# Patient Record
Sex: FEMALE | Race: WHITE | Employment: FULL TIME | RURAL
[De-identification: names, ages, dates, MRNs, and addresses within clinical notes are randomized per-mention and may not be internally consistent; named-entity substitution may affect disease eponyms.]

---

## 2017-01-20 ENCOUNTER — TELEPHONE (OUTPATIENT)
Dept: FAMILY MEDICINE CLINIC | Age: 42
End: 2017-01-20

## 2017-01-20 DIAGNOSIS — N30.00 ACUTE CYSTITIS WITHOUT HEMATURIA: Primary | ICD-10-CM

## 2017-01-20 RX ORDER — FLUCONAZOLE 150 MG/1
150 TABLET ORAL DAILY
Qty: 1 TAB | Refills: 0 | Status: SHIPPED | OUTPATIENT
Start: 2017-01-20 | End: 2017-01-21

## 2017-01-20 RX ORDER — SULFAMETHOXAZOLE AND TRIMETHOPRIM 800; 160 MG/1; MG/1
1 TABLET ORAL 2 TIMES DAILY
Qty: 14 TAB | Refills: 0 | Status: SHIPPED | OUTPATIENT
Start: 2017-01-20 | End: 2017-01-23

## 2017-01-23 RX ORDER — CIPROFLOXACIN 500 MG/1
500 TABLET ORAL 2 TIMES DAILY
Qty: 20 TAB | Refills: 0 | Status: SHIPPED | OUTPATIENT
Start: 2017-01-23 | End: 2017-02-02

## 2017-01-23 NOTE — TELEPHONE ENCOUNTER
Patient had allergic reaction to Bactrim. Per Eleni, will send in Cipro. Requested Prescriptions     Signed Prescriptions Disp Refills    ciprofloxacin HCl (CIPRO) 500 mg tablet 20 Tab 0     Sig: Take 1 Tab by mouth two (2) times a day for 10 days.      Authorizing Provider: Griffin Alexander, [address] [place] [address]     Ordering User: Kavon Mondragon

## 2017-05-23 ENCOUNTER — HOSPITAL ENCOUNTER (OUTPATIENT)
Dept: MAMMOGRAPHY | Age: 42
Discharge: HOME | End: 2017-05-23
Payer: COMMERCIAL

## 2017-05-23 DIAGNOSIS — N63.20 LEFT BREAST LUMP: ICD-10-CM

## 2017-05-23 DIAGNOSIS — N63.0 LUMP OR MASS IN BREAST: ICD-10-CM

## 2017-05-23 PROCEDURE — 76642 ULTRASOUND BREAST LIMITED: CPT

## 2017-05-23 PROCEDURE — 77066 DX MAMMO INCL CAD BI: CPT

## 2017-06-06 ENCOUNTER — HOSPITAL ENCOUNTER (OUTPATIENT)
Dept: ULTRASOUND IMAGING | Age: 42
Discharge: HOME | End: 2017-06-06
Attending: NURSE PRACTITIONER
Payer: COMMERCIAL

## 2017-06-06 DIAGNOSIS — R10.84 ABDOMINAL PAIN, GENERALIZED: ICD-10-CM

## 2017-06-06 PROCEDURE — 76700 US EXAM ABDOM COMPLETE: CPT

## 2017-06-13 ENCOUNTER — OFFICE VISIT (OUTPATIENT)
Dept: SURGERY | Age: 42
End: 2017-06-13

## 2017-06-13 VITALS
BODY MASS INDEX: 43.87 KG/M2 | DIASTOLIC BLOOD PRESSURE: 77 MMHG | HEART RATE: 98 BPM | WEIGHT: 257 LBS | HEIGHT: 64 IN | TEMPERATURE: 98.5 F | SYSTOLIC BLOOD PRESSURE: 123 MMHG | RESPIRATION RATE: 14 BRPM | OXYGEN SATURATION: 98 %

## 2017-06-13 DIAGNOSIS — K80.20 GALLSTONES: Primary | ICD-10-CM

## 2017-06-13 RX ORDER — PANTOPRAZOLE SODIUM 40 MG/1
40 GRANULE, DELAYED RELEASE ORAL DAILY
COMMUNITY
End: 2017-11-20

## 2017-06-13 NOTE — PROGRESS NOTES
Cholelithasis Consultation      Subjective:      Vira Alarcon is an 38 y.o.  female who was referred by:  Nikki Laguerre MD for evaluation of cholelithasis. Patient complains of epigastric pain. Pain is moderate in intensity. It has been present for several weeks. It radiates to her right shoulder and back. Eating makes it worse. Nothing makes it better. It is associated with nausea, occasional vomiting, reflux . She denies fevers, chills and malaise. Past Medical History:   Diagnosis Date    Headache     Hearing loss      Family History   Problem Relation Age of Onset    Headache Father     Neuropathy Father     Neuropathy Sister     Cancer Maternal Grandmother     Cancer Paternal Grandmother     Breast Cancer Paternal Grandmother     Cancer Paternal Grandfather     Headache Other     Breast Cancer Paternal Aunt     Breast Cancer Cousin      Cannot display prior to admission medications because the patient has not been admitted in this contact. Allergies   Allergen Reactions    Bactrim [Sulfamethoprim Ds] Rash     Social History     Social History    Marital status:      Spouse name: N/A    Number of children: N/A    Years of education: N/A     Occupational History    Not on file. Social History Main Topics    Smoking status: Never Smoker    Smokeless tobacco: Not on file    Alcohol use No    Drug use: Not on file    Sexual activity: Not on file     Other Topics Concern    Not on file     Social History Narrative       Review of Systems: A comprehensive review of systems was negative except for that written in the HPI.      Objective:        Visit Vitals    /77 (BP 1 Location: Left arm, BP Patient Position: Sitting)    Pulse 98    Temp 98.5 °F (36.9 °C) (Oral)    Resp 14    Ht 5' 3.5\" (1.613 m)    Wt 257 lb (116.6 kg)    LMP 05/25/2017 (Exact Date)    SpO2 98%    BMI 44.81 kg/m2        Visit Vitals    /77 (BP 1 Location: Left arm, BP Patient Position: Sitting)    Pulse 98    Temp 98.5 °F (36.9 °C) (Oral)    Resp 14    Ht 5' 3.5\" (1.613 m)    Wt 257 lb (116.6 kg)    LMP 2017 (Exact Date)    SpO2 98%    BMI 44.81 kg/m2     General appearance: alert, cooperative, no distress, appears stated age, morbidly obese  Head: Normocephalic, without obvious abnormality, atraumatic  Eyes: conjunctivae/corneas clear., EOM's intact. Lungs: clear to auscultation bilaterally  Heart: regular rate and rhythm, S1, S2 normal, no murmur, click, rub or gallop  Abdomen: soft, non-tender. Bowel sounds normal. No masses,  no organomegaly  Neurologic: Alert and oriented X 3, normal strength and tone. . Normal coordination and gait    Imaging:  images and reports reviewed      Assessment:     Symptomatic Cholelithasis    Plan/Recommendations/Medical Decision Makin. I recommend laparoscopic cholecystectomy with possible cholangiogram  Treatment alternatives were discussed. 2. Discussed aspects of surgical intervention, methods, risks (including by not limited to infection, bleeding, retained gallstones in the abdominal cavity and/or the main bile ducts, and injury to adjacent structures including the biliary system, common bile duct, and intestines.)  The patient understands the risks, any and all questions were answered to the patient's satisfaction. 3. Patient does wish to proceed with surgery.        Surgery : laparoscopic cholecystectomy, cholangiogram, possible open    Length:  1 hours    Diagnosis :     ICD-10-CM ICD-9-CM   1. Gallstones K80.20 574.20       Anesthesia : general anesthesia    Surgery Type: Outpatient    Position:  supine    Hospital : [facility]    Blood thinner NO

## 2017-06-13 NOTE — PROGRESS NOTES
1. Have you been to the ER, urgent care clinic since your last visit? Hospitalized since your last visit?no  2. Have you seen or consulted any other health care providers outside of the [address] [place] [address] since your last visit? Include any pap smears or colon screening.  no

## 2017-06-19 ENCOUNTER — HOSPITAL ENCOUNTER (OUTPATIENT)
Dept: PREADMISSION TESTING | Age: 42
Discharge: HOME | End: 2017-06-19
Payer: COMMERCIAL

## 2017-06-19 ENCOUNTER — TELEPHONE (OUTPATIENT)
Dept: SURGERY | Age: 42
End: 2017-06-19

## 2017-06-19 ENCOUNTER — HOSPITAL ENCOUNTER (OUTPATIENT)
Dept: GENERAL RADIOLOGY | Age: 42
Discharge: HOME | End: 2017-06-19
Attending: SURGERY
Payer: COMMERCIAL

## 2017-06-19 VITALS
OXYGEN SATURATION: 99 % | DIASTOLIC BLOOD PRESSURE: 80 MMHG | SYSTOLIC BLOOD PRESSURE: 117 MMHG | WEIGHT: 257 LBS | HEART RATE: 96 BPM | RESPIRATION RATE: 16 BRPM | TEMPERATURE: 98.1 F | BODY MASS INDEX: 43.87 KG/M2 | HEIGHT: 64 IN

## 2017-06-19 LAB
ALBUMIN SERPL BCP-MCNC: 3.6 G/DL (ref 3.5–5)
ALBUMIN/GLOB SERPL: 1.1 {RATIO} (ref 1.1–2.2)
ALP SERPL-CCNC: 61 U/L (ref 45–117)
ALT SERPL-CCNC: 49 U/L (ref 12–78)
ANION GAP BLD CALC-SCNC: 10 MMOL/L (ref 5–15)
AST SERPL W P-5'-P-CCNC: 57 U/L (ref 15–37)
ATRIAL RATE: 81 BPM
BASOPHILS # BLD AUTO: 0 K/UL (ref 0–0.1)
BASOPHILS # BLD: 1 % (ref 0–1)
BILIRUB SERPL-MCNC: 0.3 MG/DL (ref 0.2–1)
BUN SERPL-MCNC: 12 MG/DL (ref 6–20)
BUN/CREAT SERPL: 18 (ref 12–20)
CALCIUM SERPL-MCNC: 8.5 MG/DL (ref 8.5–10.1)
CALCULATED P AXIS, ECG09: 17 DEGREES
CALCULATED R AXIS, ECG10: 11 DEGREES
CALCULATED T AXIS, ECG11: 7 DEGREES
CHLORIDE SERPL-SCNC: 104 MMOL/L (ref 97–108)
CO2 SERPL-SCNC: 25 MMOL/L (ref 21–32)
CREAT SERPL-MCNC: 0.67 MG/DL (ref 0.55–1.02)
DIAGNOSIS, 93000: NORMAL
EOSINOPHIL # BLD: 0.2 K/UL (ref 0–0.4)
EOSINOPHIL NFR BLD: 2 % (ref 0–7)
ERYTHROCYTE [DISTWIDTH] IN BLOOD BY AUTOMATED COUNT: 13.6 % (ref 11.5–14.5)
GLOBULIN SER CALC-MCNC: 3.3 G/DL (ref 2–4)
GLUCOSE SERPL-MCNC: 93 MG/DL (ref 65–100)
HCT VFR BLD AUTO: 40.3 % (ref 35–47)
HGB BLD-MCNC: 13.3 G/DL (ref 11.5–16)
LYMPHOCYTES # BLD AUTO: 31 % (ref 12–49)
LYMPHOCYTES # BLD: 2.3 K/UL (ref 0.8–3.5)
MCH RBC QN AUTO: 29.9 PG (ref 26–34)
MCHC RBC AUTO-ENTMCNC: 33 G/DL (ref 30–36.5)
MCV RBC AUTO: 90.6 FL (ref 80–99)
MONOCYTES # BLD: 0.4 K/UL (ref 0–1)
MONOCYTES NFR BLD AUTO: 6 % (ref 5–13)
NEUTS SEG # BLD: 4.6 K/UL (ref 1.8–8)
NEUTS SEG NFR BLD AUTO: 60 % (ref 32–75)
P-R INTERVAL, ECG05: 140 MS
PLATELET # BLD AUTO: 269 K/UL (ref 150–400)
POTASSIUM SERPL-SCNC: 4.4 MMOL/L (ref 3.5–5.1)
PROT SERPL-MCNC: 6.9 G/DL (ref 6.4–8.2)
Q-T INTERVAL, ECG07: 372 MS
QRS DURATION, ECG06: 74 MS
QTC CALCULATION (BEZET), ECG08: 432 MS
RBC # BLD AUTO: 4.45 M/UL (ref 3.8–5.2)
SODIUM SERPL-SCNC: 139 MMOL/L (ref 136–145)
VENTRICULAR RATE, ECG03: 81 BPM
WBC # BLD AUTO: 7.5 K/UL (ref 3.6–11)

## 2017-06-19 PROCEDURE — 71020 XR CHEST PA LAT: CPT

## 2017-06-19 PROCEDURE — 36415 COLL VENOUS BLD VENIPUNCTURE: CPT | Performed by: SURGERY

## 2017-06-19 PROCEDURE — 80053 COMPREHEN METABOLIC PANEL: CPT | Performed by: SURGERY

## 2017-06-19 PROCEDURE — 93005 ELECTROCARDIOGRAM TRACING: CPT

## 2017-06-19 PROCEDURE — 85025 COMPLETE CBC W/AUTO DIFF WBC: CPT | Performed by: SURGERY

## 2017-06-19 NOTE — TELEPHONE ENCOUNTER
Informed patient she needs cardiac clearance in order to have surgery.  Appointment set for 6/21/17 with Dr Dionisio Shelley at [address] cardiac clearance faxed to [phone] patient did not voice any further questions or concerns

## 2017-06-19 NOTE — PERIOP NOTES
[facility]  PREOPERATIVE INSTRUCTIONS    Surgery Date:   6/26/2017  Surgery arrival time given by surgeon: NO   If no,[facility] [address] staff will call you between 4 PM- 8 PM the day before surgery with your arrival time. If your surgery is on a Monday, we will call you the preceding Friday. Please call [phone] after 8 PM if you did not receive your arrival time. 1. Please report at the designated time to the 2nd [address] [facility]. Bring your insurance card, photo identification, and any copayment ( if applicable). 2. You must have a responsible adult to drive you home. You need to have a responsible adult to stay with you the first 24 hours after surgery if you are going home the same day of your surgery and you should not drive a car for 24 hours following your surgery. 3. Nothing to eat or drink after midnight the night before surgery. This includes no water, gum, mints, coffee, juice, etc.  Please note special instructions, if applicable, below for medications. 4. MEDICATIONS TO TAKE THE MORNING OF SURGERY WITH A SIP OF WATER: protonix  5. No alcoholic beverages 24 hours before or after your surgery. 6. If you are being admitted to the hospital,please leave personal belongings/luggage in your car until you have an assigned hospital room number. 7. Stop Aspirin and/or any non-steroidal anti-inflammatory drugs (i.e. Ibuprofen, Naproxen, Advil, Aleve) as directed by your surgeon. You may take Tylenol. Stop herbal supplements 1 week prior to  surgery. 8. If you are currently taking Plavix, Coumadin,or any other blood-thinning/anticoagulant medication contact your surgeon for instructions. 9. Please wear comfortable clothes. Wear your glasses instead of contacts. We ask that all money, jewelry and valuables be left at home. Wear no make up, particularly mascara, the day of surgery. 10.  All body piercings, rings,and jewelry need to be removed and left at home. Please wear your hair loose or down. Please no pony-tails, buns, or any metal hair accessories. If you shower the morning of surgery, please do not apply any lotions, powders, or deodorants afterwards. Do not shave any body area within 24 hours of your surgery. 11. Please follow all instructions to avoid any potential surgical cancellation. 12.  Should your physical condition change, (i.e. fever, cold, flu, etc.) please notify your surgeon as soon as possible. 13. It is important to be on time. If a situation occurs where you may be delayed, please call:  [phone] / [phone] on the day of surgery. 14. The Preadmission Testing staff can be reached at [phone]. .  15. Special instructions: free  parking for anyone 7-5    The patient was contacted  in person. She  verbalize  understanding of all instructions does not  need reinforcement.

## 2017-06-21 ENCOUNTER — OFFICE VISIT (OUTPATIENT)
Dept: CARDIOLOGY CLINIC | Age: 42
End: 2017-06-21

## 2017-06-21 ENCOUNTER — CLINICAL SUPPORT (OUTPATIENT)
Dept: CARDIOLOGY CLINIC | Age: 42
End: 2017-06-21

## 2017-06-21 VITALS
RESPIRATION RATE: 18 BRPM | HEIGHT: 64 IN | WEIGHT: 257 LBS | SYSTOLIC BLOOD PRESSURE: 120 MMHG | OXYGEN SATURATION: 99 % | DIASTOLIC BLOOD PRESSURE: 70 MMHG | BODY MASS INDEX: 43.87 KG/M2 | HEART RATE: 82 BPM

## 2017-06-21 DIAGNOSIS — Z01.810 PREOP CARDIOVASCULAR EXAM: ICD-10-CM

## 2017-06-21 DIAGNOSIS — R94.31 ABNORMAL EKG: Primary | ICD-10-CM

## 2017-06-21 DIAGNOSIS — Z01.818 PRE-OP TESTING: ICD-10-CM

## 2017-06-21 NOTE — PROGRESS NOTES
Chief Complaint   Patient presents with    Surgical Clearance     Referred by Dr. Elva Rudolph - Cholecystectomy 06/26/2017     Visit Vitals    /70    Pulse 82    Resp 18    Ht 5' 3.5\" (1.613 m)    Wt 257 lb (116.6 kg)    LMP 05/25/2017 (Exact Date)    SpO2 99%    BMI 44.81 kg/m2

## 2017-06-23 ENCOUNTER — ANESTHESIA EVENT (OUTPATIENT)
Dept: SURGERY | Age: 42
End: 2017-06-23
Payer: COMMERCIAL

## 2017-06-26 ENCOUNTER — APPOINTMENT (OUTPATIENT)
Dept: GENERAL RADIOLOGY | Age: 42
End: 2017-06-26
Attending: SURGERY
Payer: COMMERCIAL

## 2017-06-26 ENCOUNTER — ANESTHESIA (OUTPATIENT)
Dept: SURGERY | Age: 42
End: 2017-06-26
Payer: COMMERCIAL

## 2017-06-26 ENCOUNTER — HOSPITAL ENCOUNTER (OUTPATIENT)
Age: 42
Setting detail: OUTPATIENT SURGERY
Discharge: HOME | End: 2017-06-26
Attending: SURGERY | Admitting: SURGERY
Payer: COMMERCIAL

## 2017-06-26 VITALS
RESPIRATION RATE: 16 BRPM | BODY MASS INDEX: 42.87 KG/M2 | WEIGHT: 251.1 LBS | SYSTOLIC BLOOD PRESSURE: 110 MMHG | OXYGEN SATURATION: 95 % | TEMPERATURE: 98.6 F | HEART RATE: 65 BPM | DIASTOLIC BLOOD PRESSURE: 72 MMHG | HEIGHT: 64 IN

## 2017-06-26 LAB — HCG UR QL: NEGATIVE

## 2017-06-26 PROCEDURE — 74011000250 HC RX REV CODE- 250: Performed by: SURGERY

## 2017-06-26 PROCEDURE — 74300 X-RAY BILE DUCTS/PANCREAS: CPT

## 2017-06-26 PROCEDURE — 76210000021 HC REC RM PH II 0.5 TO 1 HR: Performed by: SURGERY

## 2017-06-26 PROCEDURE — 77030035051: Performed by: SURGERY

## 2017-06-26 PROCEDURE — 77030012029 HC APPL CLP LIG COVD -C: Performed by: SURGERY

## 2017-06-26 PROCEDURE — 77030035045 HC TRCR ENDOSC VRSPRT BLDLSS COVD -B: Performed by: SURGERY

## 2017-06-26 PROCEDURE — 74011000250 HC RX REV CODE- 250

## 2017-06-26 PROCEDURE — 77030008756 HC TU IRR SUC STRY -B: Performed by: SURGERY

## 2017-06-26 PROCEDURE — 77030037032 HC INSRT SCIS CLICKLLINE DISP STOR -B: Performed by: SURGERY

## 2017-06-26 PROCEDURE — 81025 URINE PREGNANCY TEST: CPT

## 2017-06-26 PROCEDURE — 77030018836 HC SOL IRR NACL ICUM -A: Performed by: SURGERY

## 2017-06-26 PROCEDURE — 77030019908 HC STETH ESOPH SIMS -A: Performed by: ANESTHESIOLOGY

## 2017-06-26 PROCEDURE — 77030010507 HC ADH SKN DERMBND J&J -B: Performed by: SURGERY

## 2017-06-26 PROCEDURE — 77030020747 HC TU INSUF ENDOSC TELE -A: Performed by: SURGERY

## 2017-06-26 PROCEDURE — 88304 TISSUE EXAM BY PATHOLOGIST: CPT | Performed by: SURGERY

## 2017-06-26 PROCEDURE — 77030009852 HC PCH RTVR ENDOSC COVD -B: Performed by: SURGERY

## 2017-06-26 PROCEDURE — 74011250636 HC RX REV CODE- 250/636: Performed by: ANESTHESIOLOGY

## 2017-06-26 PROCEDURE — 74011250637 HC RX REV CODE- 250/637: Performed by: SURGERY

## 2017-06-26 PROCEDURE — 77030026438 HC STYL ET INTUB CARD -A: Performed by: ANESTHESIOLOGY

## 2017-06-26 PROCEDURE — 77030004813 HC CATH CHOLGM TELE -B: Performed by: SURGERY

## 2017-06-26 PROCEDURE — 76060000033 HC ANESTHESIA 1 TO 1.5 HR: Performed by: SURGERY

## 2017-06-26 PROCEDURE — 74011000250 HC RX REV CODE- 250: Performed by: ANESTHESIOLOGY

## 2017-06-26 PROCEDURE — 74011636320 HC RX REV CODE- 636/320: Performed by: SURGERY

## 2017-06-26 PROCEDURE — 77030008684 HC TU ET CUF COVD -B: Performed by: ANESTHESIOLOGY

## 2017-06-26 PROCEDURE — 77030002895 HC DEV VASC CLOSR COVD -B: Performed by: SURGERY

## 2017-06-26 PROCEDURE — 77030020263 HC SOL INJ SOD CL0.9% LFCR 1000ML: Performed by: SURGERY

## 2017-06-26 PROCEDURE — 76210000006 HC OR PH I REC 0.5 TO 1 HR: Performed by: SURGERY

## 2017-06-26 PROCEDURE — 77030031139 HC SUT VCRL2 J&J -A: Performed by: SURGERY

## 2017-06-26 PROCEDURE — 74011250636 HC RX REV CODE- 250/636

## 2017-06-26 PROCEDURE — 77030035048 HC TRCR ENDOSC OPTCL COVD -B: Performed by: SURGERY

## 2017-06-26 PROCEDURE — 76010000149 HC OR TIME 1 TO 1.5 HR: Performed by: SURGERY

## 2017-06-26 PROCEDURE — 77030002933 HC SUT MCRYL J&J -A: Performed by: SURGERY

## 2017-06-26 PROCEDURE — 77030011640 HC PAD GRND REM COVD -A: Performed by: SURGERY

## 2017-06-26 PROCEDURE — 77030032490 HC SLV COMPR SCD KNE COVD -B: Performed by: SURGERY

## 2017-06-26 PROCEDURE — 77030020782 HC GWN BAIR PAWS FLX 3M -B

## 2017-06-26 PROCEDURE — 77030009403 HC ELECTRD ENDO MEGA -B: Performed by: SURGERY

## 2017-06-26 RX ORDER — NEOSTIGMINE METHYLSULFATE 1 MG/ML
INJECTION INTRAVENOUS AS NEEDED
Status: DISCONTINUED | OUTPATIENT
Start: 2017-06-26 | End: 2017-06-26 | Stop reason: HOSPADM

## 2017-06-26 RX ORDER — DIPHENHYDRAMINE HYDROCHLORIDE 50 MG/ML
12.5 INJECTION, SOLUTION INTRAMUSCULAR; INTRAVENOUS AS NEEDED
Status: DISCONTINUED | OUTPATIENT
Start: 2017-06-26 | End: 2017-06-26 | Stop reason: HOSPADM

## 2017-06-26 RX ORDER — MIDAZOLAM HYDROCHLORIDE 1 MG/ML
2 INJECTION, SOLUTION INTRAMUSCULAR; INTRAVENOUS
Status: DISCONTINUED | OUTPATIENT
Start: 2017-06-26 | End: 2017-06-26 | Stop reason: HOSPADM

## 2017-06-26 RX ORDER — OXYCODONE AND ACETAMINOPHEN 5; 325 MG/1; MG/1
2 TABLET ORAL
Qty: 45 TAB | Refills: 0 | Status: SHIPPED | OUTPATIENT
Start: 2017-06-26 | End: 2017-11-20

## 2017-06-26 RX ORDER — OXYCODONE AND ACETAMINOPHEN 5; 325 MG/1; MG/1
2 TABLET ORAL ONCE
Status: COMPLETED | OUTPATIENT
Start: 2017-06-26 | End: 2017-06-26

## 2017-06-26 RX ORDER — NALOXONE HYDROCHLORIDE 0.4 MG/ML
0.2 INJECTION, SOLUTION INTRAMUSCULAR; INTRAVENOUS; SUBCUTANEOUS
Status: DISCONTINUED | OUTPATIENT
Start: 2017-06-26 | End: 2017-06-26 | Stop reason: HOSPADM

## 2017-06-26 RX ORDER — ROCURONIUM BROMIDE 10 MG/ML
INJECTION, SOLUTION INTRAVENOUS AS NEEDED
Status: DISCONTINUED | OUTPATIENT
Start: 2017-06-26 | End: 2017-06-26 | Stop reason: HOSPADM

## 2017-06-26 RX ORDER — ONDANSETRON 2 MG/ML
INJECTION INTRAMUSCULAR; INTRAVENOUS AS NEEDED
Status: DISCONTINUED | OUTPATIENT
Start: 2017-06-26 | End: 2017-06-26 | Stop reason: HOSPADM

## 2017-06-26 RX ORDER — SODIUM CHLORIDE, SODIUM LACTATE, POTASSIUM CHLORIDE, CALCIUM CHLORIDE 600; 310; 30; 20 MG/100ML; MG/100ML; MG/100ML; MG/100ML
125 INJECTION, SOLUTION INTRAVENOUS CONTINUOUS
Status: DISCONTINUED | OUTPATIENT
Start: 2017-06-26 | End: 2017-06-26 | Stop reason: HOSPADM

## 2017-06-26 RX ORDER — LIDOCAINE HYDROCHLORIDE 10 MG/ML
0.1 INJECTION, SOLUTION EPIDURAL; INFILTRATION; INTRACAUDAL; PERINEURAL AS NEEDED
Status: DISCONTINUED | OUTPATIENT
Start: 2017-06-26 | End: 2017-06-26 | Stop reason: HOSPADM

## 2017-06-26 RX ORDER — GLYCOPYRROLATE 0.2 MG/ML
INJECTION INTRAMUSCULAR; INTRAVENOUS AS NEEDED
Status: DISCONTINUED | OUTPATIENT
Start: 2017-06-26 | End: 2017-06-26 | Stop reason: HOSPADM

## 2017-06-26 RX ORDER — FLUMAZENIL 0.1 MG/ML
0.2 INJECTION INTRAVENOUS
Status: DISCONTINUED | OUTPATIENT
Start: 2017-06-26 | End: 2017-06-26 | Stop reason: HOSPADM

## 2017-06-26 RX ORDER — MIDAZOLAM HYDROCHLORIDE 1 MG/ML
INJECTION, SOLUTION INTRAMUSCULAR; INTRAVENOUS AS NEEDED
Status: DISCONTINUED | OUTPATIENT
Start: 2017-06-26 | End: 2017-06-26 | Stop reason: HOSPADM

## 2017-06-26 RX ORDER — LIDOCAINE HYDROCHLORIDE 20 MG/ML
INJECTION, SOLUTION EPIDURAL; INFILTRATION; INTRACAUDAL; PERINEURAL AS NEEDED
Status: DISCONTINUED | OUTPATIENT
Start: 2017-06-26 | End: 2017-06-26 | Stop reason: HOSPADM

## 2017-06-26 RX ORDER — KETOROLAC TROMETHAMINE 30 MG/ML
INJECTION, SOLUTION INTRAMUSCULAR; INTRAVENOUS AS NEEDED
Status: DISCONTINUED | OUTPATIENT
Start: 2017-06-26 | End: 2017-06-26 | Stop reason: HOSPADM

## 2017-06-26 RX ORDER — PROPOFOL 10 MG/ML
INJECTION, EMULSION INTRAVENOUS AS NEEDED
Status: DISCONTINUED | OUTPATIENT
Start: 2017-06-26 | End: 2017-06-26 | Stop reason: HOSPADM

## 2017-06-26 RX ORDER — CEFAZOLIN SODIUM 1 G/3ML
INJECTION, POWDER, FOR SOLUTION INTRAMUSCULAR; INTRAVENOUS AS NEEDED
Status: DISCONTINUED | OUTPATIENT
Start: 2017-06-26 | End: 2017-06-26 | Stop reason: HOSPADM

## 2017-06-26 RX ORDER — FENTANYL CITRATE 50 UG/ML
INJECTION, SOLUTION INTRAMUSCULAR; INTRAVENOUS AS NEEDED
Status: DISCONTINUED | OUTPATIENT
Start: 2017-06-26 | End: 2017-06-26 | Stop reason: HOSPADM

## 2017-06-26 RX ORDER — CEFAZOLIN SODIUM IN 0.9 % NACL 2 G/50 ML
2 INTRAVENOUS SOLUTION, PIGGYBACK (ML) INTRAVENOUS
Status: DISCONTINUED | OUTPATIENT
Start: 2017-06-26 | End: 2017-06-26 | Stop reason: HOSPADM

## 2017-06-26 RX ORDER — DEXAMETHASONE SODIUM PHOSPHATE 4 MG/ML
INJECTION, SOLUTION INTRA-ARTICULAR; INTRALESIONAL; INTRAMUSCULAR; INTRAVENOUS; SOFT TISSUE AS NEEDED
Status: DISCONTINUED | OUTPATIENT
Start: 2017-06-26 | End: 2017-06-26 | Stop reason: HOSPADM

## 2017-06-26 RX ORDER — HYDROMORPHONE HYDROCHLORIDE 1 MG/ML
.25-1 INJECTION, SOLUTION INTRAMUSCULAR; INTRAVENOUS; SUBCUTANEOUS
Status: DISCONTINUED | OUTPATIENT
Start: 2017-06-26 | End: 2017-06-26 | Stop reason: HOSPADM

## 2017-06-26 RX ORDER — BUPIVACAINE HYDROCHLORIDE AND EPINEPHRINE 5; 5 MG/ML; UG/ML
INJECTION, SOLUTION EPIDURAL; INTRACAUDAL; PERINEURAL AS NEEDED
Status: DISCONTINUED | OUTPATIENT
Start: 2017-06-26 | End: 2017-06-26 | Stop reason: HOSPADM

## 2017-06-26 RX ADMIN — GLYCOPYRROLATE 0.5 MG: 0.2 INJECTION INTRAMUSCULAR; INTRAVENOUS at 08:15

## 2017-06-26 RX ADMIN — ONDANSETRON 4 MG: 2 INJECTION INTRAMUSCULAR; INTRAVENOUS at 07:41

## 2017-06-26 RX ADMIN — CEFAZOLIN SODIUM 2 G: 1 INJECTION, POWDER, FOR SOLUTION INTRAMUSCULAR; INTRAVENOUS at 07:33

## 2017-06-26 RX ADMIN — NEOSTIGMINE METHYLSULFATE 3 MG: 1 INJECTION INTRAVENOUS at 08:15

## 2017-06-26 RX ADMIN — ROCURONIUM BROMIDE 40 MG: 10 INJECTION, SOLUTION INTRAVENOUS at 07:33

## 2017-06-26 RX ADMIN — SODIUM CHLORIDE, SODIUM LACTATE, POTASSIUM CHLORIDE, AND CALCIUM CHLORIDE 125 ML/HR: 600; 310; 30; 20 INJECTION, SOLUTION INTRAVENOUS at 09:00

## 2017-06-26 RX ADMIN — MIDAZOLAM HYDROCHLORIDE 2 MG: 1 INJECTION, SOLUTION INTRAMUSCULAR; INTRAVENOUS at 07:29

## 2017-06-26 RX ADMIN — DEXAMETHASONE SODIUM PHOSPHATE 4 MG: 4 INJECTION, SOLUTION INTRA-ARTICULAR; INTRALESIONAL; INTRAMUSCULAR; INTRAVENOUS; SOFT TISSUE at 07:41

## 2017-06-26 RX ADMIN — PROPOFOL 200 MG: 10 INJECTION, EMULSION INTRAVENOUS at 07:33

## 2017-06-26 RX ADMIN — SODIUM CHLORIDE, SODIUM LACTATE, POTASSIUM CHLORIDE, AND CALCIUM CHLORIDE 125 ML/HR: 600; 310; 30; 20 INJECTION, SOLUTION INTRAVENOUS at 06:49

## 2017-06-26 RX ADMIN — OXYCODONE HYDROCHLORIDE AND ACETAMINOPHEN 2 TABLET: 5; 325 TABLET ORAL at 10:13

## 2017-06-26 RX ADMIN — KETOROLAC TROMETHAMINE 30 MG: 30 INJECTION, SOLUTION INTRAMUSCULAR; INTRAVENOUS at 08:16

## 2017-06-26 RX ADMIN — FENTANYL CITRATE 50 MCG: 50 INJECTION, SOLUTION INTRAMUSCULAR; INTRAVENOUS at 08:30

## 2017-06-26 RX ADMIN — PROMETHAZINE HYDROCHLORIDE 12.5 MG: 25 INJECTION INTRAMUSCULAR; INTRAVENOUS at 08:40

## 2017-06-26 RX ADMIN — FENTANYL CITRATE 25 MCG: 50 INJECTION, SOLUTION INTRAMUSCULAR; INTRAVENOUS at 08:28

## 2017-06-26 RX ADMIN — FENTANYL CITRATE 150 MCG: 50 INJECTION, SOLUTION INTRAMUSCULAR; INTRAVENOUS at 07:33

## 2017-06-26 RX ADMIN — FENTANYL CITRATE 25 MCG: 50 INJECTION, SOLUTION INTRAMUSCULAR; INTRAVENOUS at 08:17

## 2017-06-26 RX ADMIN — LIDOCAINE HYDROCHLORIDE 40 MG: 20 INJECTION, SOLUTION EPIDURAL; INFILTRATION; INTRACAUDAL; PERINEURAL at 07:33

## 2017-06-26 NOTE — OP NOTES
Laparoscopic Cholecystectomy with IOC Procedure Note    Indications: This patient presents with a symptomatic gallbladder disease and will undergo laparoscopic cholecystecomy. Proceedure: laparoscopic cholecystectomy with Cholangiogram    Pre-operative Diagnosis: Calculus of gallbladder without mention of cholecystitis or obstruction    Post-operative Diagnosis:  Calculus of gallbladder without mention of cholecystitis or obstruction    Surgeon: Donaldo Johnson MD     Anesthesia: General endotracheal anesthesia    ASA Class: 1      Assistant:  Surgeon(s):  Donaldo Johnson MD    Procedure Details   The patient was seen again in the Holding Room. The risks, benefits, complications, treatment options, and expected outcomes were discussed with the patient. The possibilities of reaction to medication, pulmonary aspiration, perforation of viscus, bleeding, recurrent infection, finding a normal gallbladder, the need for additional procedures, failure to diagnose a condition, the possible need to convert to an open procedure, and creating a complication requiring transfusion or operation were discussed with the patient. The patient and/or family concurred with the proposed plan, giving informed consent. The patient was taken to Operating Room, identified as [facility] Andrews and the procedure verified as Laparoscopic Cholecystectomy with possible Intraoperative Cholangiograms. A Time Out was held and the above information confirmed. Prior to the induction of general anesthesia,  antibiotic prophylaxis was administered. General endotracheal anesthesia was then administered and tolerated well. After the induction, the abdomen was prepped in the usual sterile fashion. The abdomen was entered in the right upper quadrant in the midclavicular line just below the costal margin. At this site, 3 mL of 0.5% Marcaine was injected in the subcutaneous space. A 5-mm incision made with an 11-blade scalpel.  Then a 5-mm Xcel trocar containing 5-mm 0-degree laparoscope was used to dissect through the layers of the abdominal wall under direct laparoscopic vision. Entry into the abdomen was confirmed visually. Once within the abdomen, insufflation was provided through this trocar to a pressure of 15 mmHg. The patient tolerated insufflation well and there were no apparent complications. A 360-degree evaluation of the abdomen was then performed from this trocar  site. There were no peritoneal implants identified. There were no abnormalities on the surface of the liver. Attention was then focused at the umbilicus. Marcaine plain 0.5% 3 mL was injected in the subcutaneous space, as well as the preperitoneal space. A 12-mm curvilinear incision was made at the base of the umbilicus, and then a 17-NB Xcel trocar was inserted under direct laparoscopic vision into the abdominal cavity. The camera was then switched to this trocar position. The patient was then placed in reverse Trendelenburg with right side up. Attention was focused at the right upper quadrant, and 2 additional trocars were placed. One 5-mm trocar was placed in the epigastrium just below the xyphyoid The third 5-mm trocar was placed in the anterior axillary line just below the costal margin. At both sites, 3 mL of 0.5% Marcaine was injected into the skin and the preperitoneal space, a 5-mm incision made, and then the 5-mm trocar inserted under direct laparoscopic vision. The fundus of the gallbladder was then grasped and retracted over the dome of the liver. The infundibulum was grasped and retracted to the right and inferiorly. Blunt dissection was used to dissect out the space between the infundibulum and the gallbladder bed, and then blunt dissection was used to dissect out the cystic duct and  cystic artery. A critical view was obtained where these were the only 2 structures entering the gallbladder.  Once this critical view was obtained, a clip was placed at the base of the infundibulum. A choledochotomy was made in the very distal cystic duct. Then a 14-gauge angiocatheter was used to introduce an Arrow cholangiogram catheter into the abdominal cavity. The catheter was irrigated with saline and then inserted into the choledochotomy. Once within the cystic duct, the balloon was inflated and the cystic duct irrigated with normal saline. There was no evidence of any leakage out through the choledochotomy, indicating good seal with the balloon. Of note, the saline irrigated easily without much resistance. All instruments were removed from the patient's abdomen. She was placed in a supine position and a C-arm was brought into the field. A  film was shot to obtain proper orientation and alignment of the C-arm, and then, under real-time fluoroscopy, contrast was injected slowly by hand. Contrast was seen flowing through the cystic duct and into a common bile duct. There was no filling defect seen in the common bile duct and the duct was not dilated. Good  flow into the  duodenum was visualized. Contrast easily refluxed up the common hepatic  duct and into right and left hepatic ducts and into secondary and tertiary  biliary radicles within the liver without signs of filling defects. The C-arm was removed, the cholangiogram catheter removed. Two clips were  placed on the proximal cystic duct and then the choledochotomy  completed with laparoscopic scissors. The artery was clipped and divided in a similar fashion. The infundibulum of the gallbladder was then retracted towards the anterior abdominal wall and the gallbladder removed from the gallbladder fossa with electrocautery. The gallbladder was then placed over the right lobe of the liver. An Endocatch bag was then inserted through the umbilical trochar. The gallbladder was then placed in the EndoCatch bag and removed through the umbilical trocar site. The gallbladder was then sent to Pathology.     Next, the right upper quadrant was inspected. The gallbladder fossa appeared dry. There was no evidence of any bleeding. The cystic duct remnant had 2 clips across it, and an Endoloop, with no evidence of any leakage of bile. The cystic artery remnant was inspected. It had 2 clips across it with no evidence of any bleeding. The right upper quadrant was then irrigated with 300 mL of normal saline. The irrigation came back  clear. Attention was then focused on the umbilical trocar site, and the fascia at  this umbilical trocar site was closed with a transfascial sutures of 0  Vicryl. This was done using an Endo Close device under direct laparoscopic  vision. Once this suture was tied, there was no loss of pneumoperitoneum through the umbilical trocar site and no palpable defect, indicating adequate closure of the trocar site. Pneumoinsufflation was then vented through the 5-mm trocar sites. The trocars were then removed and the skin at all trocar sites closed with 4-0 Monocryl and Dermabond. The patient was extubated in the room and taken to the [id number] [facility] in stable condition. Instrument, sponge, and needle counts were correct x2. Findings:   Cholelithiasis  Normal cholangiogram    Estimated Blood Loss:  Minimal               Specimens: Gallbladder             Complications:  None; patient tolerated the procedure well. Disposition: PACU - hemodynamically stable.            Condition: stable    Attending Attestation: I was present and scrubbed for the entire procedure

## 2017-06-26 NOTE — DISCHARGE INSTRUCTIONS
Cholecystectomy: What to Expect at [address] [place] [address]  After your surgery, it is normal to feel weak and tired for several days after you return home. Your belly may be swollen. If you had laparoscopic surgery, you may also have pain in your shoulder for about 24 hours. You may have gas or need to burp a lot at first, and a few people get diarrhea. The diarrhea usually goes away in 2 to 4 weeks, but it may last longer. How quickly you recover depends on whether you had a laparoscopic or open surgery. · For a laparoscopic surgery, most people can go back to work or their normal routine in 1 to 2 weeks, but it may take longer, depending on the type of work you do. · For an open surgery, it will probably take 4 to 6 weeks before you get back to your normal routine. This care sheet gives you a general idea about how long it will take for you to recover. However, each person recovers at a different pace. Follow the steps below to get better as quickly as possible. How can you care for yourself at home? Activity  · Rest when you feel tired. Getting enough sleep will help you recover. · Try to walk each day. Start out by walking a little more than you did the day before. Gradually increase the amount you walk. Walking boosts blood flow and helps prevent pneumonia and constipation. · For about 2 to 4 weeks, avoid lifting anything that would make you strain. This may include a child, heavy grocery bags and milk containers, a heavy briefcase or backpack, cat litter or dog food bags, or a vacuum . · Avoid strenuous activities, such as biking, jogging, weightlifting, and aerobic exercise, until your doctor says it is okay. · You may shower 24 to 48 hours after surgery, if your doctor okays it. Pat the cut (incision) dry. Do not take a bath for the first 2 weeks, or until your doctor tells you it is okay.   · You may drive when you are no longer taking pain medicine and can quickly move your foot from the gas pedal to the brake. You must also be able to sit comfortably for a long period of time, even if you do not plan to go far. You might get caught in traffic. · For a laparoscopic surgery, most people can go back to work or their normal routine in 1 to 2 weeks, but it may take longer. For an open surgery, it will probably take 4 to 6 weeks before you get back to your normal routine. · Your doctor will tell you when you can have sex again. Diet  · Eat smaller meals more often instead of fewer larger meals. You can eat a normal diet, but avoid eating fatty foods for about 1 month. Fatty foods include hamburger, whole milk, cheese, and many snack foods. If your stomach is upset, try bland, low-fat foods like plain rice, broiled chicken, toast, and yogurt. · Drink plenty of fluids (unless your doctor tells you not to). · If you have diarrhea, try avoiding spicy foods, dairy products, fatty foods, and alcohol. You can also watch to see if specific foods cause it, and stop eating them. If the diarrhea continues for more than 2 weeks, talk to your doctor. · You may notice that your bowel movements are not regular right after your surgery. This is common. Try to avoid constipation and straining with bowel movements. You may want to take a fiber supplement every day. If you have not had a bowel movement after a couple of days, ask your doctor about taking a mild laxative. Medicines  · Your doctor will tell you if and when you can restart your medicines. He or she will also give you instructions about taking any new medicines. · If you take blood thinners, such as warfarin (Coumadin), clopidogrel (Plavix), or aspirin, be sure to talk to your doctor. He or she will tell you if and when to start taking those medicines again. Make sure that you understand exactly what your doctor wants you to do. · Take pain medicines exactly as directed.   ¨ If the doctor gave you a prescription medicine for pain, take it as prescribed. ¨ If you are not taking a prescription pain medicine, take an over-the-counter medicine such as acetaminophen (Tylenol), ibuprofen (Advil, Motrin), or naproxen (Aleve). Read and follow all instructions on the label. ¨ Do not take two or more pain medicines at the same time unless the doctor told you to. Many pain medicines contain acetaminophen, which is Tylenol. Too much Tylenol can be harmful. · If you think your pain medicine is making you sick to your stomach:  ¨ Take your medicine after meals (unless your doctor tells you not to). ¨ Ask your doctor for a different pain medicine. · If your doctor prescribed antibiotics, take them as directed. Do not stop taking them just because you feel better. You need to take the full course of antibiotics. Incision care  · If you have strips of tape on the incision, or cut, leave the tape on for a week or until it falls off. · After 24 to 48 hours, wash the area daily with warm, soapy water, and pat it dry. · You may have staples to hold the cut together. Keep them dry until your doctor takes them out. This is usually in 7 to 10 days. · Keep the area clean and dry. You may cover it with a gauze bandage if it weeps or rubs against clothing. Change the bandage every day. Ice  · To reduce swelling and pain, put ice or a cold pack on your belly for 10 to 20 minutes at a time. Do this every 1 to 2 hours. Put a thin cloth between the ice and your skin. Follow-up care is a key part of your treatment and safety. Be sure to make and go to all appointments, and call your doctor if you are having problems. It's also a good idea to know your test results and keep a list of the medicines you take. When should you call for help? Call 911 anytime you think you may need emergency care. For example, call if:  · You passed out (lost consciousness). · You have severe trouble breathing. · You have sudden chest pain and shortness of breath, or you cough up blood.   Call your doctor now or seek immediate medical care if:  · You are sick to your stomach and cannot drink fluids. · You have pain that does not get better when you take your pain medicine. · You have signs of infection, such as:  ¨ Increased pain, swelling, warmth, or redness. ¨ Red streaks leading from the incision. ¨ Pus draining from the incision. ¨ Swollen lymph nodes in your neck, armpits, or groin. ¨ A fever. · Your urine turns dark brown or your stool is light-colored or jose-colored. · Your skin or the whites of your eyes turn yellow. · Bright red blood has soaked through a large bandage over your incision. · You have signs of a blood clot, such as:  ¨ Pain in your calf, back of knee, thigh, or groin. ¨ Redness and swelling in your leg or groin. · You have trouble passing urine or stool, especially if you have mild pain or swelling in your lower belly. Watch closely for any changes in your health, and be sure to contact your doctor if:  · You had a laparoscopic surgery and your shoulder pain lasts more than 24 hours. · You do not have a bowel movement after taking a laxative. Where can you learn more? Go to http://akira-carol.info/. Enter [phone] in the search box to learn more about \"Cholecystectomy: What to Expect at Home. \"  Current as of: August 9, 2016  Content Version: 11.3  © 1891-7977 [employer]. Care instructions adapted under license by [employer] (which disclaims liability or warranty for this information). If you have questions about a medical condition or this instruction, always ask your healthcare professional. [place] [address] any warranty or liability for your use of this information.       Mp Arellano MD, PhD, FACS  General Surgery at [facility], [address]  [place], [address]  [phone]  Fax [phone]      DISCHARGE SUMMARY from your Nurse    The following personal items collected during your admission are returned to you:   Dental Appliance: Dental Appliances: None  Vision: Visual Aid: None  Hearing Aid:    Jewelry: Jewelry: None  Clothing: Clothing: Other (comment) (clothes to locker)  Other Valuables: Other Valuables: None  Valuables sent to safe:      PATIENT INSTRUCTIONS:    After general anesthesia or intravenous sedation, for 24 hours or while taking prescription Narcotics:  · Limit your activities  · Do not drive and operate hazardous machinery  · Do not make important personal or business decisions  · Do  not drink alcoholic beverages  · If you have not urinated within 8 hours after discharge, please contact your surgeon on call. Report the following to your surgeon:  · Excessive pain, swelling, redness or odor of or around the surgical area  · Temperature over 100.5  · Nausea and vomiting lasting longer than 4 hours or if unable to take medications  · Any signs of decreased circulation or nerve impairment to extremity: change in color, persistent  numbness, tingling, coldness or increase pain  · Any questions    COUGH AND DEEP BREATHE    Breathing deep and coughing are very important exercises to do after surgery. Deep breathing and coughing open the little air tubes and air sacks in your lungs. You take deep breaths every day. You may not even notice - it is just something you do when you sigh or yawn. It is a natural exercise you do to keep these air passages open. After surgery, take deep breaths and cough, on purpose. Coughing and deep breathing help prevent bronchitis and pneumonia after surgery. If you had chest or belly surgery, use a pillow as a \"hug buddy\" and hold it tightly to your chest or belly when you cough. DIRECTIONS:  6. Take 10 to 15 slow deep breaths every hour while awake. 7. Breathe in deeply, and hold it for 2 seconds.   8. Exhale slowly through puckered lips, like blowing up a balloon. 9. After every 4th or 5th deep breath, hug your pillow to your chest or belly and give a hard, deep cough. Yes, it will probably hurt. But doing this exercise is very important part of healing after surgery. Take your pain medicine to help you do this exercise without too much pain. IF YOU HAVE BEEN DIAGNOSED WITH SLEEP APNEA, PLEASE USE YOUR SLEEIFP APNEA DEVICE OR CPAP MACHINE WHEN YOU INTEND TO NAP AFTER TAKING PAIN MEDICATION. Ankle Pumps    Ankle pumps increase the circulation of oxygenated blood to your lower extremities and decrease your risk for circulation problems such as blood clots. They also stretch the muscles, tendons and ligaments in your foot and ankle, and prevent joint contracture in the ankle and foot, especially after surgeries on the legs. It is important to do ankle pump exercises regularly after surgery because immobility increases your risk for developing a blood clot. Your doctor may also have you take an Aspirin for the next few days as well. If your doctor did not ask you to take an Aspirin, consult with him before starting Aspirin therapy on your own. Slowly point your foot forward, feeling the muscles on the top of your lower leg stretch, and hold this position for 5 seconds. Next, pull your foot back toward you as far as possible, stretching the calf muscles, and hold that position for 5 seconds. Repeat with the other foot. Perform 10 repetitions every hour while awake for both ankles if possible (down and then up with the foot once is one repetition). You should feel gentle stretching of the muscles in your lower leg when doing this exercise. If you feel pain, or your range of motion is limited, don't  Push too hard. Only go the limit your joint and muscles will let you go. If you have increasing pain, progressively worsening leg warmth or swelling, STOP the exercise and call your doctor.      Below is information about the medications your doctor is prescribing after your visit:    Other information in your discharge envelope:  []     PRESCRIPTIONS  []     PHYSICAL THERAPY PRESCRIPTION  []     APPOINTMENT CARDS  []     Regional Anesthesia Pamphlet for block or block with On-Q Catheter from Anesthesia Service  []     Medical device information sheets/pamphlets from their    []     School/work excuse note. []     /parent work excuse note. These are general instructions for a healthy lifestyle:    *  Please give a list of your current medications to your Primary Care Provider. *  Please update this list whenever your medications are discontinued, doses are      changed, or new medications (including over-the-counter products) are added. *  Please carry medication information at all times in case of emergency situations. About Smoking  No smoking / No tobacco products / Avoid exposure to second hand smoke    Surgeon General's Warning:  Quitting smoking now greatly reduces serious risk to your health. Obesity, smoking, and sedentary lifestyle greatly increases your risk for illness and disease. A healthy diet, regular physical exercise & weight monitoring are important for maintaining a healthy lifestyle. Congestive Heart Failure  You may be retaining fluid if you have a history of heart failure or if you experience any of the following symptoms:  Weight gain of 3 pounds or more overnight or 5 pounds in a week, increased swelling in our hands or feet or shortness of breath while lying flat in bed. Please call your doctor as soon as you notice any of these symptoms; do not wait until your next office visit.     Recognize signs and symptoms of STROKE:  F - face looks uneven  A - arms unable to move or move even  S - speech slurred or non-existent  T - time-call 911 as soon as signs and symptoms begin-DO NOT go         Back to bed or wait to see if you get better-TIME IS BRAIN. Warning signs of HEART ATTACK  Call 911 if you have these symptoms    · Chest discomfort. Most heart attacks involve discomfort in the center of the chest that lasts more than a few minutes, or that goes away and comes back. It can feel like uncomfortable pressure, squeezing, fullness, or pain. · Discomfort in other areas of the upper body. Symptoms can include pain or discomfort in one or both        Arms, the back, neck, jaw, or stomach. ·  Shortness of breath with or without chest discomfort. · Other signs may include breaking out in a cold sweat, nausea, or lightheadedness    Don't wait more than five minutes to call 911 - MINUTES MATTER! Fast action can save your life. Calling 911 is almost always the fastest way to get lifesaving treatment. Emergency Medical Services staff can begin treatment when they arrive - up to an hour sooner than if someone gets to the hospital by car. ELO BACK MEDICATION AND SIDE EFFECT GUIDE    The [facility] MEDICATION AND SIDE EFFECT GUIDE was provided to the PATIENT AND CARE PROVIDER.   Information provided includes instruction about drug purpose and common side effects for the following medications:    · Percocet

## 2017-06-26 NOTE — ANESTHESIA PREPROCEDURE EVALUATION
Anesthetic History   No history of anesthetic complications            Review of Systems / Medical History  Patient summary reviewed and pertinent labs reviewed    Pulmonary  Within defined limits                 Neuro/Psych              Cardiovascular  Within defined limits                     GI/Hepatic/Renal     GERD           Endo/Other        Obesity     Other Findings              Physical Exam    Airway  Mallampati: I  TM Distance: 4 - 6 cm  Neck ROM: normal range of motion   Mouth opening: Normal     Cardiovascular    Rhythm: regular  Rate: normal         Dental  No notable dental hx       Pulmonary  Breath sounds clear to auscultation               Abdominal         Other Findings            Anesthetic Plan    ASA: 1  Anesthesia type: general            Anesthetic plan and risks discussed with: Patient

## 2017-06-26 NOTE — IP AVS SNAPSHOT
Current Discharge Medication List  
  
START taking these medications Dose & Instructions Dispensing Information Comments Morning Noon Evening Bedtime  
 oxyCODONE-acetaminophen 5-325 mg per tablet Commonly known as:  PERCOCET Your last dose was: Your next dose is:    
   
   
 Dose:  2 Tab Take 2 Tabs by mouth every four (4) hours as needed for Pain. Max Daily Amount: 12 Tabs. Quantity:  45 Tab Refills:  0 CONTINUE these medications which have NOT CHANGED Dose & Instructions Dispensing Information Comments Morning Noon Evening Bedtime  
 hydrOXYzine HCl 25 mg tablet Commonly known as:  ATARAX Your last dose was: Your next dose is: Take  by mouth nightly. Takes 1-2 tab Refills:  0 PROTONIX 40 mg granules for oral suspension Generic drug:  pantoprazole Your last dose was: Your next dose is:    
   
   
 Dose:  40 mg  
40 mg daily. Refills:  0 Where to Get Your Medications Information on where to get these meds will be given to you by the nurse or doctor. ! Ask your nurse or doctor about these medications  
  oxyCODONE-acetaminophen 5-325 mg per tablet

## 2017-06-26 NOTE — ANESTHESIA POSTPROCEDURE EVALUATION
Post-Anesthesia Evaluation and Assessment    Patient: Alfredo Lopez MRN: [id number]  SSN: [id number]    YOB: 1975  Age: 38 y.o. Sex: female       Cardiovascular Function/Vital Signs  Visit Vitals    /62    Pulse 60    Temp 36.7 °C (98 °F)    Resp 15    Ht 5' 3.5\" (1.613 m)    Wt 113.9 kg (251 lb 1.7 oz)    SpO2 98%    BMI 43.78 kg/m2       Patient is status post general anesthesia for Procedure(s):  LAPAROSCOPIC CHOLECYSTECTOMY WITH GRAMS . Nausea/Vomiting: None    Postoperative hydration reviewed and adequate. Pain:  Pain Scale 1: Numeric (0 - 10) (06/26/17 0835)  Pain Intensity 1: 0 (06/26/17 0835)   Managed    Neurological Status:   Neuro (WDL): Within Defined Limits (06/26/17 0835)  Neuro  LUE Motor Response: Purposeful (06/26/17 0835)  LLE Motor Response: Purposeful (06/26/17 0835)  RUE Motor Response: Purposeful (06/26/17 0835)  RLE Motor Response: Purposeful (06/26/17 0835)   At baseline    Mental Status and Level of Consciousness: Arousable    Pulmonary Status:   O2 Device: Nasal cannula (06/26/17 0838)   Adequate oxygenation and airway patent    Complications related to anesthesia: None    Post-anesthesia assessment completed.  No concerns    Signed By: Cuca Carpenter MD     June 26, 2017

## 2017-06-26 NOTE — INTERVAL H&P NOTE
H&P Update:  Nicholas Angulo was seen and examined. History and physical has been reviewed. The patient has been examined.  There have been no significant clinical changes since the completion of the originally dated History and Physical.    Signed By: Richard Serna MD     June 26, 2017 7:12 AM

## 2017-06-26 NOTE — H&P (VIEW-ONLY)
Cholelithasis Consultation      Subjective:      Louis Uriarte is an 38 y.o.  female who was referred by:  Ignacio Velez MD for evaluation of cholelithasis. Patient complains of epigastric pain. Pain is moderate in intensity. It has been present for several weeks. It radiates to her right shoulder and back. Eating makes it worse. Nothing makes it better. It is associated with nausea, occasional vomiting, reflux . She denies fevers, chills and malaise. Past Medical History:   Diagnosis Date    Headache     Hearing loss      Family History   Problem Relation Age of Onset    Headache Father     Neuropathy Father     Neuropathy Sister     Cancer Maternal Grandmother     Cancer Paternal Grandmother     Breast Cancer Paternal Grandmother     Cancer Paternal Grandfather     Headache Other     Breast Cancer Paternal Aunt     Breast Cancer Cousin      Cannot display prior to admission medications because the patient has not been admitted in this contact. Allergies   Allergen Reactions    Bactrim [Sulfamethoprim Ds] Rash     Social History     Social History    Marital status:      Spouse name: N/A    Number of children: N/A    Years of education: N/A     Occupational History    Not on file. Social History Main Topics    Smoking status: Never Smoker    Smokeless tobacco: Not on file    Alcohol use No    Drug use: Not on file    Sexual activity: Not on file     Other Topics Concern    Not on file     Social History Narrative       Review of Systems: A comprehensive review of systems was negative except for that written in the HPI.      Objective:        Visit Vitals    /77 (BP 1 Location: Left arm, BP Patient Position: Sitting)    Pulse 98    Temp 98.5 °F (36.9 °C) (Oral)    Resp 14    Ht 5' 3.5\" (1.613 m)    Wt 257 lb (116.6 kg)    LMP 05/25/2017 (Exact Date)    SpO2 98%    BMI 44.81 kg/m2        Visit Vitals    /77 (BP 1 Location: Left arm, BP Patient Position: Sitting)    Pulse 98    Temp 98.5 °F (36.9 °C) (Oral)    Resp 14    Ht 5' 3.5\" (1.613 m)    Wt 257 lb (116.6 kg)    LMP 2017 (Exact Date)    SpO2 98%    BMI 44.81 kg/m2     General appearance: alert, cooperative, no distress, appears stated age, morbidly obese  Head: Normocephalic, without obvious abnormality, atraumatic  Eyes: conjunctivae/corneas clear., EOM's intact. Lungs: clear to auscultation bilaterally  Heart: regular rate and rhythm, S1, S2 normal, no murmur, click, rub or gallop  Abdomen: soft, non-tender. Bowel sounds normal. No masses,  no organomegaly  Neurologic: Alert and oriented X 3, normal strength and tone. . Normal coordination and gait    Imaging:  images and reports reviewed      Assessment:     Symptomatic Cholelithasis    Plan/Recommendations/Medical Decision Makin. I recommend laparoscopic cholecystectomy with possible cholangiogram  Treatment alternatives were discussed. 2. Discussed aspects of surgical intervention, methods, risks (including by not limited to infection, bleeding, retained gallstones in the abdominal cavity and/or the main bile ducts, and injury to adjacent structures including the biliary system, common bile duct, and intestines.)  The patient understands the risks, any and all questions were answered to the patient's satisfaction. 3. Patient does wish to proceed with surgery.        Surgery : laparoscopic cholecystectomy, cholangiogram, possible open    Length:  1 hours    Diagnosis :     ICD-10-CM ICD-9-CM   1. Gallstones K80.20 574.20       Anesthesia : general anesthesia    Surgery Type: Outpatient    Position:  supine    Hospital : [facility]    Blood thinner NO

## 2017-06-26 NOTE — IP AVS SNAPSHOT
Rima Parra 
 
 
 [address] 
[phone] Patient: Jere Huynh MRN: [id number] MXD:[id number] You are allergic to the following Allergen Reactions Bactrim (Sulfamethoprim Ds) Hives Shellfish Derived Hives Itching Recent Documentation Height Weight BMI OB Status Smoking Status 1.613 m 113.9 kg 43.78 kg/m2 Having regular periods Never Smoker Emergency Contacts Name Discharge Info Relation Home Work Mobile Nakul Mon DISCHARGE CAREGIVER [3] Spouse [3]   [phone] Blaine Mon  Spouse [3] [phone] About your hospitalization You were admitted on:  June 26, 2017 You last received care in the:  [facility] PACU You were discharged on:  June 26, 2017 Unit phone number:  [phone] Why you were hospitalized Your primary diagnosis was:  Not on File Providers Seen During Your Hospitalizations Provider Role Specialty Primary office phone Solange Morrison MD Attending Provider General Surgery [phone] Your Primary Care Physician (PCP) Primary Care Physician Office Phone Office Fax [facility] [phone] Follow-up Information Follow up With Details Comments Contact Info Gisselle Pierson MD   [address] [facility] [address] [phone] Your Appointments Monday July 10, 2017  3:45 PM EDT  
POST OP 10 MIN with Solange Morrison MD  
[address] [facility] Surgery [facility]) [address]  
[phone] Current Discharge Medication List  
  
START taking these medications Dose & Instructions Dispensing Information Comments Morning Noon Evening Bedtime  
 oxyCODONE-acetaminophen 5-325 mg per tablet Commonly known as:  PERCOCET Your last dose was: Your next dose is: Dose:  2 Tab Take 2 Tabs by mouth every four (4) hours as needed for Pain. Max Daily Amount: 12 Tabs. Quantity:  45 Tab Refills:  0 CONTINUE these medications which have NOT CHANGED Dose & Instructions Dispensing Information Comments Morning Noon Evening Bedtime  
 hydrOXYzine HCl 25 mg tablet Commonly known as:  ATARAX Your last dose was: Your next dose is: Take  by mouth nightly. Takes 1-2 tab Refills:  0 PROTONIX 40 mg granules for oral suspension Generic drug:  pantoprazole Your last dose was: Your next dose is:    
   
   
 Dose:  40 mg  
40 mg daily. Refills:  0 Where to Get Your Medications Information on where to get these meds will be given to you by the nurse or doctor. ! Ask your nurse or doctor about these medications  
  oxyCODONE-acetaminophen 5-325 mg per tablet Discharge Instructions Cholecystectomy: What to Expect at [facility] Your Recovery After your surgery, it is normal to feel weak and tired for several days after you return home. Your belly may be swollen. If you had laparoscopic surgery, you may also have pain in your shoulder for about 24 hours. You may have gas or need to burp a lot at first, and a few people get diarrhea. The diarrhea usually goes away in 2 to 4 weeks, but it may last longer. How quickly you recover depends on whether you had a laparoscopic or open surgery. · For a laparoscopic surgery, most people can go back to work or their normal routine in 1 to 2 weeks, but it may take longer, depending on the type of work you do. · For an open surgery, it will probably take 4 to 6 weeks before you get back to your normal routine. This care sheet gives you a general idea about how long it will take for you to recover. However, each person recovers at a different pace.  Follow the steps below to get better as quickly as possible. How can you care for yourself at home? Activity · Rest when you feel tired. Getting enough sleep will help you recover. · Try to walk each day. Start out by walking a little more than you did the day before. Gradually increase the amount you walk. Walking boosts blood flow and helps prevent pneumonia and constipation. · For about 2 to 4 weeks, avoid lifting anything that would make you strain. This may include a child, heavy grocery bags and milk containers, a heavy briefcase or backpack, cat litter or dog food bags, or a vacuum . · Avoid strenuous activities, such as biking, jogging, weightlifting, and aerobic exercise, until your doctor says it is okay. · You may shower 24 to 48 hours after surgery, if your doctor okays it. Pat the cut (incision) dry. Do not take a bath for the first 2 weeks, or until your doctor tells you it is okay. · You may drive when you are no longer taking pain medicine and can quickly move your foot from the gas pedal to the brake. You must also be able to sit comfortably for a long period of time, even if you do not plan to go far. You might get caught in traffic. · For a laparoscopic surgery, most people can go back to work or their normal routine in 1 to 2 weeks, but it may take longer. For an open surgery, it will probably take 4 to 6 weeks before you get back to your normal routine. · Your doctor will tell you when you can have sex again. Diet · Eat smaller meals more often instead of fewer larger meals. You can eat a normal diet, but avoid eating fatty foods for about 1 month. Fatty foods include hamburger, whole milk, cheese, and many snack foods. If your stomach is upset, try bland, low-fat foods like plain rice, broiled chicken, toast, and yogurt. · Drink plenty of fluids (unless your doctor tells you not to).  
· If you have diarrhea, try avoiding spicy foods, dairy products, fatty foods, and alcohol. You can also watch to see if specific foods cause it, and stop eating them. If the diarrhea continues for more than 2 weeks, talk to your doctor. · You may notice that your bowel movements are not regular right after your surgery. This is common. Try to avoid constipation and straining with bowel movements. You may want to take a fiber supplement every day. If you have not had a bowel movement after a couple of days, ask your doctor about taking a mild laxative. Medicines · Your doctor will tell you if and when you can restart your medicines. He or she will also give you instructions about taking any new medicines. · If you take blood thinners, such as warfarin (Coumadin), clopidogrel (Plavix), or aspirin, be sure to talk to your doctor. He or she will tell you if and when to start taking those medicines again. Make sure that you understand exactly what your doctor wants you to do. · Take pain medicines exactly as directed. ¨ If the doctor gave you a prescription medicine for pain, take it as prescribed. ¨ If you are not taking a prescription pain medicine, take an over-the-counter medicine such as acetaminophen (Tylenol), ibuprofen (Advil, Motrin), or naproxen (Aleve). Read and follow all instructions on the label. ¨ Do not take two or more pain medicines at the same time unless the doctor told you to. Many pain medicines contain acetaminophen, which is Tylenol. Too much Tylenol can be harmful. · If you think your pain medicine is making you sick to your stomach: 
¨ Take your medicine after meals (unless your doctor tells you not to). ¨ Ask your doctor for a different pain medicine. · If your doctor prescribed antibiotics, take them as directed. Do not stop taking them just because you feel better. You need to take the full course of antibiotics. Incision care · If you have strips of tape on the incision, or cut, leave the tape on for a week or until it falls off. · After 24 to 48 hours, wash the area daily with warm, soapy water, and pat it dry. · You may have staples to hold the cut together. Keep them dry until your doctor takes them out. This is usually in 7 to 10 days. · Keep the area clean and dry. You may cover it with a gauze bandage if it weeps or rubs against clothing. Change the bandage every day. Ice · To reduce swelling and pain, put ice or a cold pack on your belly for 10 to 20 minutes at a time. Do this every 1 to 2 hours. Put a thin cloth between the ice and your skin. Follow-up care is a key part of your treatment and safety. Be sure to make and go to all appointments, and call your doctor if you are having problems. It's also a good idea to know your test results and keep a list of the medicines you take. When should you call for help? Call 911 anytime you think you may need emergency care. For example, call if: 
· You passed out (lost consciousness). · You have severe trouble breathing. · You have sudden chest pain and shortness of breath, or you cough up blood. Call your doctor now or seek immediate medical care if: 
· You are sick to your stomach and cannot drink fluids. · You have pain that does not get better when you take your pain medicine. · You have signs of infection, such as: 
¨ Increased pain, swelling, warmth, or redness. ¨ Red streaks leading from the incision. ¨ Pus draining from the incision. ¨ Swollen lymph nodes in your neck, armpits, or groin. ¨ A fever. · Your urine turns dark brown or your stool is light-colored or jose-colored. · Your skin or the whites of your eyes turn yellow. · Bright red blood has soaked through a large bandage over your incision. · You have signs of a blood clot, such as: 
¨ Pain in your calf, back of knee, thigh, or groin. ¨ Redness and swelling in your leg or groin. · You have trouble passing urine or stool, especially if you have mild pain or swelling in your lower belly. Watch closely for any changes in your health, and be sure to contact your doctor if: 
· You had a laparoscopic surgery and your shoulder pain lasts more than 24 hours. · You do not have a bowel movement after taking a laxative. Where can you learn more? Go to http://akira-carol.info/. Enter 604 61 [phone] in the search box to learn more about \"Cholecystectomy: What to Expect at Home. \" Current as of: August 9, 2016 Content Version: 11.3 © 5196-9315 [employer]. Care instructions adapted under license by [employer] (which disclaims liability or warranty for this information). If you have questions about a medical condition or this instruction, always ask your healthcare professional. [place] [address] any warranty or liability for your use of this information. Bailee Roldan MD, PhD, [facility] General Surgery at [address], [address] [place] [address] Drive 
[phone] Fax [phone] DISCHARGE SUMMARY from your Nurse The following personal items collected during your admission are returned to you:  
Dental Appliance: Dental Appliances: None Vision: Visual Aid: None Hearing Aid:   
Jewelry: Jewelry: None Clothing: Clothing: Other (comment) (clothes to locker) Other Valuables: Other Valuables: None Valuables sent to safe:   
 
PATIENT INSTRUCTIONS: 
 
After general anesthesia or intravenous sedation, for 24 hours or while taking prescription Narcotics: · Limit your activities · Do not drive and operate hazardous machinery · Do not make important personal or business decisions · Do  not drink alcoholic beverages · If you have not urinated within 8 hours after discharge, please contact your surgeon on call. Report the following to your surgeon: 
· Excessive pain, swelling, redness or odor of or around the surgical area · Temperature over 100.5 · Nausea and vomiting lasting longer than 4 hours or if unable to take medications · Any signs of decreased circulation or nerve impairment to extremity: change in color, persistent  numbness, tingling, coldness or increase pain · Any questions [address] Breathing deep and coughing are very important exercises to do after surgery. Deep breathing and coughing open the little air tubes and air sacks in your lungs. You take deep breaths every day. You may not even notice - it is just something you do when you sigh or yawn. It is a natural exercise you do to keep these air passages open. After surgery, take deep breaths and cough, on purpose. Coughing and deep breathing help prevent bronchitis and pneumonia after surgery. If you had chest or belly surgery, use a pillow as a \"hug wanda\" and hold it tightly to your chest or belly when you cough. DIRECTIONS: 
6. Take 10 to 15 slow deep breaths every hour while awake. 7. Breathe in deeply, and hold it for 2 seconds. 8. Exhale slowly through puckered lips, like blowing up a balloon. 9. After every 4th or 5th deep breath, hug your pillow to your chest or belly and give a hard, deep cough. Yes, it will probably hurt. But doing this exercise is very important part of healing after surgery. Take your pain medicine to help you do this exercise without too much pain. IF YOU HAVE BEEN DIAGNOSED WITH SLEEP APNEA, PLEASE USE YOUR SLEEIFP APNEA DEVICE OR CPAP MACHINE WHEN YOU INTEND TO NAP AFTER TAKING PAIN MEDICATION. Ankle Pumps Ankle pumps increase the circulation of oxygenated blood to your lower extremities and decrease your risk for circulation problems such as blood clots. They also stretch the muscles, tendons and ligaments in your foot and ankle, and prevent joint contracture in the ankle and foot, especially after surgeries on the legs.  
 
It is important to do ankle pump exercises regularly after surgery because immobility increases your risk for developing a blood clot. Your doctor may also have you take an Aspirin for the next few days as well. If your doctor did not ask you to take an Aspirin, consult with him before starting Aspirin therapy on your own. Slowly point your foot forward, feeling the muscles on the top of your lower leg stretch, and hold this position for 5 seconds. Next, pull your foot back toward you as far as possible, stretching the calf muscles, and hold that position for 5 seconds. Repeat with the other foot. Perform 10 repetitions every hour while awake for both ankles if possible (down and then up with the foot once is one repetition). You should feel gentle stretching of the muscles in your lower leg when doing this exercise. If you feel pain, or your range of motion is limited, don't  Push too hard. Only go the limit your joint and muscles will let you go. If you have increasing pain, progressively worsening leg warmth or swelling, STOP the exercise and call your doctor. Below is information about the medications your doctor is prescribing after your visit: 
 
 
· Percocet Discharge Orders None Introducing [facility]! Dear Kerry Magallanes: 
Thank you for requesting a Klutch account. Our records indicate that you already have an active Klutch account. You can access your account anytime at [email or web address] Did you know that you can access your hospital and ER discharge instructions at any time in Klutch? You can also review all of your test results from your hospital stay or ER visit. Additional Information If you have questions, please visit the Frequently Asked Questions section of the Klutch website at [email or web address] Remember, Klutch is NOT to be used for urgent needs. For medical emergencies, dial 911. Now available from your iPhone and Android! General Information Please provide this summary of care documentation to your next provider. Patient Signature:  ____________________________________________________________ Date:  ____________________________________________________________  
  
Jacinta Ball Provider Signature:  ____________________________________________________________ Date:  ____________________________________________________________

## 2017-06-28 ENCOUNTER — TELEPHONE (OUTPATIENT)
Dept: SURGERY | Age: 42
End: 2017-06-28

## 2017-06-28 NOTE — TELEPHONE ENCOUNTER
Patient called office said she is doing good so far but this morning she did feel nausea, she took zofran and now she is just resting. Urine and bowels are flowing good. Pain is low.  Follow up visit already set for 7/10/17

## 2017-07-03 ENCOUNTER — TELEPHONE (OUTPATIENT)
Dept: SURGERY | Age: 42
End: 2017-07-03

## 2017-07-03 NOTE — TELEPHONE ENCOUNTER
Spoke with patient she said she is starting to have pain in her right abdomen area. She has not tried any pain medication or compress to help with the pain. She going to try medication and also put a warm compress on the area to see if that helps. She also has not had a bowel movement in a few days so she is going to start taking miralax.  Asked her if she wanted to make appointment, she said she will try it at home first and if anything changes than she will call office back

## 2017-07-03 NOTE — TELEPHONE ENCOUNTER
Sharp pain abdominal area. Also when she breaths and in shoulder. Started on Saturday. Patient lives 1hr[place]away. Post op.

## 2017-07-10 ENCOUNTER — OFFICE VISIT (OUTPATIENT)
Dept: SURGERY | Age: 42
End: 2017-07-10

## 2017-07-10 VITALS
HEIGHT: 64 IN | OXYGEN SATURATION: 98 % | WEIGHT: 257 LBS | RESPIRATION RATE: 14 BRPM | SYSTOLIC BLOOD PRESSURE: 120 MMHG | BODY MASS INDEX: 43.87 KG/M2 | HEART RATE: 98 BPM | TEMPERATURE: 98.3 F | DIASTOLIC BLOOD PRESSURE: 74 MMHG

## 2017-07-10 DIAGNOSIS — Z09 POSTOPERATIVE EXAMINATION: Primary | ICD-10-CM

## 2017-07-10 DIAGNOSIS — N61.0 CELLULITIS OF LEFT BREAST: ICD-10-CM

## 2017-07-10 RX ORDER — DOXYCYCLINE 150 MG/1
150 TABLET ORAL 2 TIMES DAILY
Qty: 20 TAB | Refills: 0 | Status: SHIPPED | OUTPATIENT
Start: 2017-07-10 | End: 2017-07-11 | Stop reason: RX

## 2017-07-10 NOTE — LETTER
NOTIFICATION OF RETURN TO WORK / SCHOOL 
 
7/10/2017 4:26 PM 
 
Ms. Clau Carrizales [facility] [address] [facility] [address] [place] To Whom It May Concern: 
 
Clau Carrizales was under the care of Alex Andersen from 6/26/2017 to present. She had surgery on 6/26/2017 She will be able to return to work/school on 7/17/2017 with lite duty restrictions for 2 weeks more. The restrictions include: No heavy lifting, nothing over 10 pounds No repeatative bending at the waist 
             No straining to push or pull No crawling No climbing ladders If there are questions or concerns please have the patient contact our office. Sincerely, Carlos Johnson MD, PhD, [facility] General Surgery at [address], [address] [facility] 
[phone] Fax [phone]

## 2017-07-10 NOTE — PROGRESS NOTES
1. Have you been to the ER, urgent care clinic since your last visit? Hospitalized since your last visit?no    2. Have you seen or consulted any other health care providers outside of the [address] [place] [address] since your last visit? Include any pap smears or colon screening.  no

## 2017-07-11 RX ORDER — DOXYCYCLINE 100 MG/1
100 TABLET ORAL 2 TIMES DAILY
Qty: 20 TAB | Refills: 0 | Status: SHIPPED | OUTPATIENT
Start: 2017-07-11 | End: 2017-07-21

## 2017-07-11 NOTE — PROGRESS NOTES
Subjective:      Daniel Fraser is a 38 y.o. female presents for postop care 2 weeks following cholecystectomy. Appetite is good. Eating a regular diet without difficulty. Bowel movements are regular. The patient is voiding without difficulty. The patient is not having any pain. .    She comes in with an unrelated complaint of cellulitis of the left breast.    Objective:     Visit Vitals    /74 (BP 1 Location: Left arm, BP Patient Position: Sitting)    Pulse 98    Temp 98.3 °F (36.8 °C) (Oral)    Resp 14    Ht 5' 3.5\" (1.613 m)    Wt 257 lb (116.6 kg)    LMP 06/23/2017 (Exact Date)    SpO2 98%    BMI 44.81 kg/m2       General:  alert, cooperative, no distress, appears stated age   Abdomen: soft, bowel sounds active, non-tender   breast 1 cm, tender, red, warm skin mass consistent with a sebaceous cyst on lateral aspect of left breast   Incision:   healing well, no drainage, no erythema, no hernia, no seroma, no swelling, no dehiscence, incision well approximated     Assessment:     1) Doing well postoperatively from cholecystectomy    2) infected skin cyst left breast.  Will attempt oral antibiotics     Plan:     1. Continue any current medications. 2. Wound care discussed. 3. Pt is to increase activities as tolerated. 4. PO doxyclycline.

## 2017-07-26 ENCOUNTER — OFFICE VISIT (OUTPATIENT)
Dept: SURGERY | Age: 42
End: 2017-07-26

## 2017-07-26 VITALS
SYSTOLIC BLOOD PRESSURE: 121 MMHG | HEIGHT: 64 IN | BODY MASS INDEX: 43.43 KG/M2 | WEIGHT: 254.4 LBS | OXYGEN SATURATION: 99 % | HEART RATE: 92 BPM | RESPIRATION RATE: 20 BRPM | DIASTOLIC BLOOD PRESSURE: 77 MMHG | TEMPERATURE: 97.5 F

## 2017-07-26 DIAGNOSIS — Z09 POSTOPERATIVE EXAMINATION: Primary | ICD-10-CM

## 2017-07-26 NOTE — MR AVS SNAPSHOT
Visit Information Date & Time Provider Department Dept. Phone Encounter #  
 7/26/2017  9:30 AM Bairon Black, [address] [facility] Surgery [phone] [id number] Upcoming Health Maintenance Date Due DTaP/Tdap/Td series (1 - Tdap) 2/10/1994 PAP AKA CERVICAL CYTOLOGY 2/10/1994 INFLUENZA AGE 9 TO ADULT 8/1/2017 Allergies as of 7/26/2017  Review Complete On: 7/26/2017 By: Bairon Black NP Severity Noted Reaction Type Reactions Bactrim [Sulfamethoprim Ds]  01/23/2017    Hives Shellfish Derived  06/19/2017    Hives, Itching Current Immunizations  Reviewed on 9/29/2016 Name Date Influenza Vaccine 9/29/2016 Not reviewed this visit You Were Diagnosed With   
  
 Codes Comments Postoperative examination    -  Primary ICD-10-CM: X17 ICD-9-CM: V67.00 Vitals BP Pulse Temp Resp Height(growth percentile) Weight(growth percentile) 121/77 92 97.5 °F (36.4 °C) (Oral) 20 5' 3.5\" (1.613 m) 254 lb 6.4 oz (115.4 kg) LMP SpO2 BMI OB Status Smoking Status 07/24/2017 99% 44.36 kg/m2 Having regular periods Never Smoker BMI and BSA Data Body Mass Index Body Surface Area 44.36 kg/m 2 2.27 m 2 Preferred Pharmacy Pharmacy Name Phone [address], [facility]ly [facility], [facility] [address] [place] [address] [phone] Your Updated Medication List  
  
   
This list is accurate as of: 7/26/17  2:24 PM.  Always use your most recent med list.  
  
  
  
  
 hydrOXYzine HCl 25 mg tablet Commonly known as:  ATARAX Take  by mouth nightly. Takes 1-2 tab  
  
 oxyCODONE-acetaminophen 5-325 mg per tablet Commonly known as:  PERCOCET Take 2 Tabs by mouth every four (4) hours as needed for Pain. Max Daily Amount: 12 Tabs. PROTONIX 40 mg granules for oral suspension Generic drug:  pantoprazole 40 mg daily. Patient Instructions Wound Check: Care Instructions Your Care Instructions People have wounds that need care for many reasons. You may have a cut that needs care after surgery. You may have a cut or puncture wound from an accident. Or you may have a wound because of a condition like diabetes. Whatever the cause of your wound, there are things you can do to care for it at home. Your doctor may also want you to come back for a wound check. The wound check lets the doctor know how your wound is healing and if you need more treatment. Follow-up care is a key part of your treatment and safety. Be sure to make and go to all appointments, and call your doctor if you are having problems. It's also a good idea to know your test results and keep a list of the medicines you take. How can you care for yourself at home? · If your doctor told you how to care for your wound, follow your doctor's instructions. If you did not get instructions, follow this general advice: ¨ You may cover the wound with a thin layer of petroleum jelly, such as Vaseline, and a nonstick bandage. ¨ Apply more petroleum jelly and replace the bandage as needed. · Keep the wound dry for the first 24 to 48 hours. After this, you can shower if your doctor okays it. Pat the wound dry. · Be safe with medicines. Read and follow all instructions on the label. ¨ If the doctor gave you a prescription medicine for pain, take it as prescribed. ¨ If you are not taking a prescription pain medicine, ask your doctor if you can take an over-the-counter medicine. · If your doctor prescribed antibiotics, take them as directed. Do not stop taking them just because you feel better. You need to take the full course of antibiotics. · If you have stitches, do not remove them on your own. Your doctor will tell you when to come back to have them removed. · If you have Steri-Strips, leave them on until they fall off.  
· If possible, prop up the injured area on a pillow anytime you sit or lie down during the next 3 days. Try to keep it above the level of your heart. This will help reduce swelling. When should you call for help? Call your doctor now or seek immediate medical care if: 
· You have new pain, or the pain gets worse. · The skin near the wound is cold or pale or changes color. · You have tingling, weakness, or numbness near the wound. · The wound starts to bleed, and blood soaks through the bandage. Oozing small amounts of blood is normal. 
· You have symptoms of infection, such as: 
¨ Increased pain, swelling, warmth, or redness. ¨ Red streaks leading from the wound. ¨ Pus draining from the wound. ¨ A fever. Watch closely for changes in your health, and be sure to contact your doctor if: 
· You do not get better as expected. Where can you learn more? Go to http://akira-carol.info/. Enter P342 in the search box to learn more about \"Wound Check: Care Instructions. \" Current as of: March 20, 2017 Content Version: 11.3 © 7821-7042 Smart GPS Backpack. Care instructions adapted under license by [email or web address] (which disclaims liability or warranty for this information). If you have questions about a medical condition or this instruction, always ask your healthcare professional. [address] any warranty or liability for your use of this information. Introducing [facility]! Dear Erick Espinal: 
Thank you for requesting a [employer] account. Our records indicate that you already have an active [employer] account. You can access your account anytime at [email or web address] Did you know that you can access your hospital and ER discharge instructions at any time in [employer]? You can also review all of your test results from your hospital stay or ER visit. Additional Information If you have questions, please visit the Frequently Asked Questions section of the [employer] website at [email or web address] Remember, [employer] is NOT to be used for urgent needs. For medical emergencies, dial 911. Now available from your iPhone and Android! Please provide this summary of care documentation to your next provider. Your primary care clinician is listed as Meridee Mention. If you have any questions after today's visit, please call [phone].

## 2017-07-26 NOTE — PROGRESS NOTES
Subjective:      Jose Jimenes is a 38 y.o. female presents for postop care following laparoscopic cholecystectomy on 6/26/2017  She complains of redness at the incision and drainage. She complains of some discomfort at her incisions    Patient has an advanced directive: NO      Ms. Denise Oscar has a reminder for a \"due or due soon\" health maintenance. I have asked that she contact her primary care provider for follow-up on this health maintenance. Objective:     Visit Vitals    /77    Pulse 92    Temp 97.5 °F (36.4 °C) (Oral)    Resp 20    Ht 5' 3.5\" (1.613 m)    Wt 254 lb 6.4 oz (115.4 kg)    LMP 07/24/2017    SpO2 99%    BMI 44.36 kg/m2               Incision:   superficial opening at the incision, no erythema. All other lap sites healed. Assessment:     Doing well postoperatively. Plan:     1. May take ibuprofen as needed for pain  2. Keep wound clean and covers. 3. Follow up as needed. Pt verbalized understanding and questions were answered to the best of my knowledge and ability.

## 2017-07-26 NOTE — PATIENT INSTRUCTIONS
Wound Check: Care Instructions  Your Care Instructions  People have wounds that need care for many reasons. You may have a cut that needs care after surgery. You may have a cut or puncture wound from an accident. Or you may have a wound because of a condition like diabetes. Whatever the cause of your wound, there are things you can do to care for it at home. Your doctor may also want you to come back for a wound check. The wound check lets the doctor know how your wound is healing and if you need more treatment. Follow-up care is a key part of your treatment and safety. Be sure to make and go to all appointments, and call your doctor if you are having problems. It's also a good idea to know your test results and keep a list of the medicines you take. How can you care for yourself at home? · If your doctor told you how to care for your wound, follow your doctor's instructions. If you did not get instructions, follow this general advice:  ¨ You may cover the wound with a thin layer of petroleum jelly, such as Vaseline, and a nonstick bandage. ¨ Apply more petroleum jelly and replace the bandage as needed. · Keep the wound dry for the first 24 to 48 hours. After this, you can shower if your doctor okays it. Pat the wound dry. · Be safe with medicines. Read and follow all instructions on the label. ¨ If the doctor gave you a prescription medicine for pain, take it as prescribed. ¨ If you are not taking a prescription pain medicine, ask your doctor if you can take an over-the-counter medicine. · If your doctor prescribed antibiotics, take them as directed. Do not stop taking them just because you feel better. You need to take the full course of antibiotics. · If you have stitches, do not remove them on your own. Your doctor will tell you when to come back to have them removed. · If you have Steri-Strips, leave them on until they fall off.   · If possible, prop up the injured area on a pillow anytime you sit or lie down during the next 3 days. Try to keep it above the level of your heart. This will help reduce swelling. When should you call for help? Call your doctor now or seek immediate medical care if:  · You have new pain, or the pain gets worse. · The skin near the wound is cold or pale or changes color. · You have tingling, weakness, or numbness near the wound. · The wound starts to bleed, and blood soaks through the bandage. Oozing small amounts of blood is normal.  · You have symptoms of infection, such as:  ¨ Increased pain, swelling, warmth, or redness. ¨ Red streaks leading from the wound. ¨ Pus draining from the wound. ¨ A fever. Watch closely for changes in your health, and be sure to contact your doctor if:  · You do not get better as expected. Where can you learn more? Go to http://akira-carol.info/. Enter P342 in the search box to learn more about \"Wound Check: Care Instructions. \"  Current as of: March 20, 2017  Content Version: 11.3  © 5859-5541 [employer]. Care instructions adapted under license by [employer] (which disclaims liability or warranty for this information). If you have questions about a medical condition or this instruction, always ask your healthcare professional. [address] any warranty or liability for your use of this information.

## 2017-07-26 NOTE — PROGRESS NOTES
Chief Complaint   Patient presents with    Surgical Follow-up     S/P LAPAROSCOPIC CHOLECYSTECTOMY WITH GRAMS 6/26/17     Patient here for CC of yellow/white discharge from  lap site on her umbilicus, she reports pain has increased but is tolerable. 1. Have you been to the ER, urgent care clinic since your last visit? Hospitalized since your last visit? NO    2. Have you seen or consulted any other health care providers outside of the [address] [place] [address] since your last visit? Include any pap smears or colon screening.  NO

## 2017-11-20 ENCOUNTER — OFFICE VISIT (OUTPATIENT)
Dept: FAMILY MEDICINE CLINIC | Age: 42
End: 2017-11-20

## 2017-11-20 VITALS
BODY MASS INDEX: 44.39 KG/M2 | WEIGHT: 260 LBS | DIASTOLIC BLOOD PRESSURE: 88 MMHG | OXYGEN SATURATION: 98 % | RESPIRATION RATE: 16 BRPM | HEIGHT: 64 IN | TEMPERATURE: 98 F | HEART RATE: 104 BPM | SYSTOLIC BLOOD PRESSURE: 136 MMHG

## 2017-11-20 DIAGNOSIS — Z01.419 WELL WOMAN EXAM: ICD-10-CM

## 2017-11-20 RX ORDER — ESOMEPRAZOLE MAGNESIUM 40 MG/1
CAPSULE, DELAYED RELEASE ORAL DAILY
COMMUNITY
End: 2018-12-06 | Stop reason: SDUPTHER

## 2017-11-20 RX ORDER — PHENTERMINE HYDROCHLORIDE 37.5 MG/1
37.5 CAPSULE ORAL
Qty: 30 CAP | Refills: 3 | Status: SHIPPED | OUTPATIENT
Start: 2017-11-20 | End: 2018-03-22 | Stop reason: SDUPTHER

## 2017-11-20 NOTE — PROGRESS NOTES
Reviewed record in preparation for visit and have obtained necessary documentation. Patient did not bring medications to visit for review. Information provided on Advanced Directive, Living Will. Body mass index is 45.33 kg/(m^2).    Health Maintenance Due   Topic Date Due    DTaP/Tdap/Td series (1 - Tdap) 02/10/1994    PAP AKA CERVICAL CYTOLOGY  02/10/1994

## 2017-11-20 NOTE — PATIENT INSTRUCTIONS

## 2017-11-20 NOTE — PROGRESS NOTES
CC: Wellness check and forms for work    HPI: Pt is a 38 y.o. female who presents for wellness check. Pt needs labs and vitals measured for work physical and forms filled out. She is feeling well apart from being stressed and would like to work on losing weight. She gets less exercise in her new job and gets home late so she has been eating fast food more lately. She is interested in trying a medication for weight loss. She has been on topamax in the past and had some bad side effects. Her mom had been on phen-phen in the past with good results.        Past Medical History:   Diagnosis Date    GERD (gastroesophageal reflux disease)     Headache     Hearing loss     Palpitations     PVC's noted in past; workup by Caridology in past [place]       Family History   Problem Relation Age of Onset    Headache Father     Neuropathy Father     Neuropathy Sister     Cancer Maternal Grandmother     Cancer Paternal Grandmother     Breast Cancer Paternal Grandmother     Cancer Paternal Grandfather     Headache Other     Breast Cancer Paternal Aunt     Breast Cancer Cousin        Social History   Substance Use Topics    Smoking status: Never Smoker    Smokeless tobacco: Never Used    Alcohol use Yes      Comment: Rarely        PE:  Visit Vitals    /88 (BP 1 Location: Right arm, BP Patient Position: Sitting)    Pulse (!) 104    Temp 98 °F (36.7 °C) (Oral)    Resp 16    Ht 5' 3.5\" (1.613 m)    Wt 260 lb (117.9 kg)    SpO2 98%    BMI 45.33 kg/m2     Gen: Pt sitting in chair, in NAD  Head: Normocephalic, atraumatic  Eyes: Sclera anicteric, EOM grossly intact  Throat: MMM  Neck: Supple  CVS: Normal S1, S2, no m/r/g  Resp: CTAB, no wheezes or rales  Extrem: Atraumatic, no cyanosis or edema  Pulses: 2+   Skin: Warm, dry  Neuro: Alert, oriented, appropriate      A/P: Pt is a 38 y.o. female who presents for wellness check and counseling on weight loss  - Lipid panel  - CMP  - Discussed options: Belviq vs Qsymia vs Contrave vs phentermine. Given cost and fact that her mother did well on phen-phen, will do trial of phentermine first  - Advised on ketogenic/low-carbohydrate diet, increase activity level, weight self daily  - RTC in 1 year for [facility] and pt will check in with self-recorded weights periodically to determine if medication working and how long to continue it. I have reviewed/discussed the above normal BMI with the patient. I have recommended the following interventions: dietary management education, guidance, and counseling and monitor weight . Discussed diagnoses in detail with patient. Medication risks/benefits/side effects discussed with patient. All of the patient's questions were addressed. The patient understands and agrees with our plan of care. The patient knows to call back if they are unsure of or forget any changes we discussed today or if the symptoms change. The patient received an After-Visit Summary which contains VS, orders, medication list and allergy list. This can be used as a \"mini-medical record\" should they have to seek medical care while out of town. Current Outpatient Prescriptions on File Prior to Visit   Medication Sig Dispense Refill    hydrOXYzine (ATARAX) 25 mg tablet Take  by mouth nightly. Takes 1-2 tab       No current facility-administered medications on file prior to visit.

## 2017-12-14 DIAGNOSIS — L50.9 HIVES: Primary | ICD-10-CM

## 2017-12-14 RX ORDER — HYDROXYZINE 25 MG/1
25 TABLET, FILM COATED ORAL
Qty: 270 TAB | Refills: 3 | Status: SHIPPED | OUTPATIENT
Start: 2017-12-14 | End: 2017-12-19 | Stop reason: SDUPTHER

## 2017-12-19 DIAGNOSIS — L50.9 HIVES: ICD-10-CM

## 2017-12-19 RX ORDER — HYDROXYZINE 25 MG/1
25 TABLET, FILM COATED ORAL
Qty: 90 TAB | Refills: 0 | Status: SHIPPED | OUTPATIENT
Start: 2017-12-19 | End: 2019-03-08 | Stop reason: SDUPTHER

## 2017-12-19 RX ORDER — HYDROXYZINE 25 MG/1
25 TABLET, FILM COATED ORAL
Qty: 270 TAB | Refills: 3 | Status: SHIPPED | OUTPATIENT
Start: 2017-12-19 | End: 2017-12-19 | Stop reason: SDUPTHER

## 2017-12-19 NOTE — TELEPHONE ENCOUNTER
Pharmacy requesting clarification of prescription for atarax. Prescription re-sent. Pt also almost out, will send 30 day supply to local pharmacy.

## 2018-01-26 ENCOUNTER — TELEPHONE (OUTPATIENT)
Dept: FAMILY MEDICINE CLINIC | Age: 43
End: 2018-01-26

## 2018-01-26 DIAGNOSIS — J09.X2 INFLUENZA A (H5N1): Primary | ICD-10-CM

## 2018-01-26 RX ORDER — OSELTAMIVIR PHOSPHATE 75 MG/1
75 CAPSULE ORAL 2 TIMES DAILY
Qty: 10 CAP | Refills: 0 | Status: SHIPPED | OUTPATIENT
Start: 2018-01-26 | End: 2018-01-31

## 2018-01-26 NOTE — TELEPHONE ENCOUNTER
Spoke with patient complaining of cold chills, body aches, sore throat. Exposed to influenza via son. Prophylaxis Tamiflu sent to pharmacy. Discussed supportive therapy.

## 2018-03-22 RX ORDER — PHENTERMINE HYDROCHLORIDE 37.5 MG/1
37.5 CAPSULE ORAL
Qty: 30 CAP | Refills: 3 | Status: SHIPPED | OUTPATIENT
Start: 2018-03-22 | End: 2018-07-23 | Stop reason: SDUPTHER

## 2018-03-22 NOTE — TELEPHONE ENCOUNTER
Medications refilled as requested. Discussed with pt and she has continued to lose weight while taking this medication.  She has been measuring her BP at home and it is normal.

## 2018-04-19 ENCOUNTER — TELEPHONE (OUTPATIENT)
Dept: FAMILY MEDICINE CLINIC | Age: 43
End: 2018-04-19

## 2018-04-19 DIAGNOSIS — L03.317 CELLULITIS OF BUTTOCK: Primary | ICD-10-CM

## 2018-04-19 RX ORDER — DOXYCYCLINE 100 MG/1
100 TABLET ORAL 2 TIMES DAILY
Qty: 20 TAB | Refills: 0 | Status: SHIPPED | OUTPATIENT
Start: 2018-04-19 | End: 2018-04-29

## 2018-04-19 NOTE — TELEPHONE ENCOUNTER
Pt called stating that her pilonidal cyst has been causing pain and some mild drainage. She has also noticed lymph node swelling in the groin area. Schedule is full today and not able to make an appt. Will send in rx for doxycycline (pt with Sulfa allergy) and if not improving, pt will make an appt to be evaluated.

## 2018-05-22 ENCOUNTER — TELEPHONE (OUTPATIENT)
Dept: FAMILY MEDICINE CLINIC | Age: 43
End: 2018-05-22

## 2018-05-22 DIAGNOSIS — R30.0 DYSURIA: Primary | ICD-10-CM

## 2018-05-22 NOTE — TELEPHONE ENCOUNTER
Pt called stating she checked her urine at work and it had 2+ LE and trace blood, otherwise normal. She had been having symptoms of dysuria and lower abdominal discomfort similar to prior UTI's. Advised her to drop her urine sample off at the lab for UCx and if symptoms worsen or change she will make an appt to be seen. All questions answered and pt feels comfortable with the plan of care.

## 2018-05-24 ENCOUNTER — TELEPHONE (OUTPATIENT)
Dept: FAMILY MEDICINE CLINIC | Age: 43
End: 2018-05-24

## 2018-05-24 LAB — BACTERIA UR CULT: NORMAL

## 2018-06-01 ENCOUNTER — OFFICE VISIT (OUTPATIENT)
Dept: FAMILY MEDICINE CLINIC | Age: 43
End: 2018-06-01

## 2018-06-01 VITALS
HEIGHT: 63 IN | OXYGEN SATURATION: 98 % | WEIGHT: 212 LBS | RESPIRATION RATE: 18 BRPM | SYSTOLIC BLOOD PRESSURE: 139 MMHG | DIASTOLIC BLOOD PRESSURE: 96 MMHG | BODY MASS INDEX: 37.56 KG/M2 | TEMPERATURE: 98.5 F | HEART RATE: 104 BPM

## 2018-06-01 DIAGNOSIS — F43.22 ADJUSTMENT DISORDER WITH ANXIOUS MOOD: Primary | ICD-10-CM

## 2018-06-01 RX ORDER — DIAZEPAM 2 MG/1
2 TABLET ORAL
Qty: 60 TAB | Refills: 0 | Status: SHIPPED | OUTPATIENT
Start: 2018-06-01 | End: 2021-03-29 | Stop reason: ALTCHOICE

## 2018-06-01 NOTE — PROGRESS NOTES
CC: follow-up    HPI: Pt is a 37 y.o. female who presents for follow-up. She is doing well on the phentermine in addition to diet and exercise and continues to lose weight. She has lost a total of 48lbs in the past 6 months. On a separate note, she has been having a tremendous amount of stress at home. They recently found out that her son has an addiction problem in addition to severe depression and he has moved back home. They have him working with counselors on a regular basis and seeing Psychiatry. Her father was recently diagnosed with cancer and her mother has been in the hospital with new PE's and pleural effusion. They are concerned she may also have cancer and are awaiting results of the pleural fluid analysis. Pt has also been helping out with her daughter and new infant grandson. She is the primary source of support for all of the people in her life and is feeling overwhelmed and panicked with everything that is going on. Pt states that she feels her heart is racing and she can't focus. Last night she couldn't sleep and was wandering around outside without even realizing what she was doing. She has not had problems with anxiety or depression in the past and in general tries to avoid medications.        Past Medical History:   Diagnosis Date    GERD (gastroesophageal reflux disease)     Headache     Hearing loss     Palpitations     PVC's noted in past; workup by Caridology in past [place]       Family History   Problem Relation Age of Onset    Headache Father     Neuropathy Father     Neuropathy Sister     Cancer Maternal Grandmother     Cancer Paternal Grandmother     Breast Cancer Paternal Grandmother     Cancer Paternal Grandfather     Headache Other     Breast Cancer Paternal Aunt     Breast Cancer Cousin        Social History   Substance Use Topics    Smoking status: Never Smoker    Smokeless tobacco: Never Used    Alcohol use Yes      Comment: Rarely        ROS:  Positive only when bolded  Constitutional: Changes in weight  Respiratory: SOB, wheezing, cough  Cardiovascular: CP, palpitations (when feeling panicked)    PE:  Visit Vitals    BP (!) 139/96 (BP 1 Location: Left arm, BP Patient Position: Sitting)    Pulse (!) 104    Temp 98.5 °F (36.9 °C) (Oral)    Resp 18    Ht 5' 3\" (1.6 m)    Wt 212 lb (96.2 kg)    SpO2 98%    BMI 37.55 kg/m2     Gen: Pt pacing the room, unable to sit still, crying. Head: Normocephalic, atraumatic  Eyes: Sclera anicteric, EOM grossly intact  Throat: MMM, normal lips, tongue, teeth and gums  Neck: Supple  CVS: Normal S1, S2, no m/r/g. Tachycardic  Resp: CTAB, no wheezes or rales  Extrem: Atraumatic, no cyanosis or edema  Pulses: 2+   Skin: Warm, dry  Neuro: Alert, oriented      A/P: Pt is a 37 y.o. female who presents for adjustment disorder with anxious mood. Discussed options with patient. At this point, I feel it is in her best interests to use a short acting anxiolytic to help her calm down enough to be able to get through the day and sleep at night so she can function. Pt understands that we will not want to continue this therapy long-term and she is not interested in that. She also understands the risk of dependence on medication, especially given her family history. She knows to keep her prescription in a safe and hidden place and to regularly check to make sure she has the right number of pills. She understands not to drink alcohol while taking this medication and to use caution with her hydroxyzine as the two medications may make her drowsy.  reviewed and appropriate. - Valium 2mg BID prn. Pt to start taking this at night and then can take one tab during the day prn for severe anxiety. - Pt will check in on a weekly basis to see how she is doing and re-evaluate appropriateness of therapy. Will recheck BP as well. I have reviewed/discussed the above normal BMI with the patient.   I have recommended the following interventions: dietary management education, guidance, and counseling and monitor weight . She is doing a great job and was encouraged on her progress today. Discussed diagnoses in detail with patient. Medication risks/benefits/side effects discussed with patient. All of the patient's questions were addressed. The patient understands and agrees with our plan of care. The patient knows to call back if they are unsure of or forget any changes we discussed today or if the symptoms change. The patient received an After-Visit Summary which contains VS, orders, medication list and allergy list. This can be used as a \"mini-medical record\" should they have to seek medical care while out of town. Current Outpatient Prescriptions on File Prior to Visit   Medication Sig Dispense Refill    phentermine 37.5 mg capsule Take 37.5 mg by mouth every morning. Max Daily Amount: 37.5 mg. 30 Cap 3    hydrOXYzine HCl (ATARAX) 25 mg tablet Take 1 Tab by mouth three (3) times daily as needed for Itching. 90 Tab 0    esomeprazole (NEXIUM) 40 mg capsule Take  by mouth daily. No current facility-administered medications on file prior to visit.

## 2018-06-01 NOTE — PROGRESS NOTES
1. Have you been to the ER, urgent care clinic since your last visit? Hospitalized since your last visit? no  2. Have you seen or consulted any other health care providers outside of the [address] [place] [address] since your last visit? Including any pap smears or colon screening. no    Reviewed record in preparation for visit and have necessary documentation    Pt did not bring medication to office visit for review  Opportunity was given for questions    Goals that were addressed and/or need to be completed during or after this appointment include   Health Maintenance Due   Topic Date Due    DTaP/Tdap/Td series (1 - Tdap) 02/10/1994    PAP AKA CERVICAL CYTOLOGY  02/10/1994     Body mass index is 37.55 kg/(m^2).

## 2018-07-23 ENCOUNTER — TELEPHONE (OUTPATIENT)
Dept: FAMILY MEDICINE CLINIC | Age: 43
End: 2018-07-23

## 2018-07-23 RX ORDER — PHENTERMINE HYDROCHLORIDE 37.5 MG/1
37.5 CAPSULE ORAL
Qty: 30 CAP | Refills: 3 | Status: SHIPPED | OUTPATIENT
Start: 2018-07-23 | End: 2018-09-06 | Stop reason: ALTCHOICE

## 2018-07-23 NOTE — TELEPHONE ENCOUNTER
Pt in office today, needs refill of phentermine. As discussed previously, will continue for one year and then stop. All questions answered and pt feels comfortable with the plan of care.

## 2018-09-06 ENCOUNTER — OFFICE VISIT (OUTPATIENT)
Dept: FAMILY MEDICINE CLINIC | Age: 43
End: 2018-09-06

## 2018-09-06 VITALS
OXYGEN SATURATION: 98 % | DIASTOLIC BLOOD PRESSURE: 96 MMHG | HEART RATE: 110 BPM | HEIGHT: 63 IN | SYSTOLIC BLOOD PRESSURE: 138 MMHG | WEIGHT: 210 LBS | RESPIRATION RATE: 16 BRPM | BODY MASS INDEX: 37.21 KG/M2 | TEMPERATURE: 97.2 F

## 2018-09-06 DIAGNOSIS — R03.0 ELEVATED BP WITHOUT DIAGNOSIS OF HYPERTENSION: ICD-10-CM

## 2018-09-06 DIAGNOSIS — R59.9 ENLARGED LYMPH NODES: ICD-10-CM

## 2018-09-06 DIAGNOSIS — Z80.41 FAMILY HISTORY OF OVARIAN CANCER: ICD-10-CM

## 2018-09-06 DIAGNOSIS — R10.2 PELVIC PAIN: Primary | ICD-10-CM

## 2018-09-06 DIAGNOSIS — Z84.81 FAMILY HISTORY OF BRCA2 GENE POSITIVE: ICD-10-CM

## 2018-09-06 PROBLEM — E66.01 SEVERE OBESITY (BMI 35.0-39.9): Status: ACTIVE | Noted: 2018-09-06

## 2018-09-06 NOTE — PROGRESS NOTES
1. Have you been to the ER, urgent care clinic since your last visit? Hospitalized since your last visit? no 
 
2. Have you seen or consulted any other health care providers outside of the [address] [place] [address] since your last visit? Include any pap smears or colon screening. no 
Reviewed record in preparation for visit and have obtained necessary documentation. Patient did not bring medications to visit for review. Information provided on Advanced Directive, Living Will. Health Maintenance Due Topic Date Due  
 DTaP/Tdap/Td series (1 - Tdap) 02/10/1994  PAP AKA CERVICAL CYTOLOGY  02/10/1994  Influenza Age 5 to Adult  08/01/2018 Body mass index is 37.2 kg/(m^2).

## 2018-09-06 NOTE — PROGRESS NOTES
CC: f/u weight loss and adjustment disorder HPI: Pt is a 37 y.o. female who presents for f/u weight loss. She has lost 50lbs since starting this about 10 months ago but feels like she has hit a plateau. She continues to deal with a lot of stress, helping both of her parents through their cancer work-ups. She was in the hospital over the weekend with her mother for an exploratory surgery for tumor debulking and biopsies to definitively determine the primary. She has only used the Valium a few times when the anxiety was very bad. 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 Lately they seem to be swollen more often, not related to her pilonidal cysts, and she thinks the lymph nodes in her axillae are swollen as well. Associated with a feeling of soreness in those areas. She also has pain in her pelvic area, L>R, that feels like a menstrual cramp/sharp pain, that happens about once a month, but she is not sure of the timing related to her menstrual cycle. Lasts a few minutes and resolves on its own. She is nervous because her mother was recently diagnosed with metastatic cancer, likely with an ovarian primary. She is still having normal menstrual cycles and recently had a normal pap with her OB/GYN. Her PGM had breast cancer diagnosed in her 42's. Her paternal aunt was diagnosed with breast cancer in her 42's. Her cousin was diagnosed in her 29's with ovarian and breast cancer and tested positive for BRCA2. Her MGM had multiple myeloma. Her father has stomach cancer currently. Of note, pt has been checking her BP at home and it has always been less than 140/90. Past Medical History:  
Diagnosis Date  GERD (gastroesophageal reflux disease)  Headache   
 Hearing loss  Palpitations PVC's noted in past; workup by Car[facility]ology in past [place] Family History Problem Relation Age of Onset  Headache Father  Neuropathy Father  Neuropathy Sister  Cancer Maternal Grandmother  Cancer Paternal Grandmother  Breast Cancer Paternal Grandmother  Cancer Paternal Grandfather  Headache Other  Breast Cancer Paternal Aunt  Breast Cancer Cousin Social History Substance Use Topics  Smoking status: Never Smoker  Smokeless tobacco: Never Used  Alcohol use Yes Comment: Rarely ROS: 
Positive only when bolded Constitutional: HA, F/C, changes in weight Respiratory: SOB, wheezing, cough Cardiovascular: CP, palpitations Gastrointestinal: Abd pain (pelvic) Hematologic/lymphatic: ABRAHAM 
 
PE: 
Visit Vitals  BP (!) 138/96 (BP 1 Location: Right arm, BP Patient Position: Sitting)  Pulse (!) 110  Temp 97.2 °F (36.2 °C) (Oral)  Resp 16  
 Ht 5' 3\" (1.6 m)  Wt 210 lb (95.3 kg)  SpO2 98%  BMI 37.2 kg/m2 Gen: Pt sitting in chair, in NAD Head: Normocephalic, atraumatic Eyes: Sclera anicteric, EOM grossly intact, PERRL Throat: MMM, normal lips, tongue and gums Neck: Supple, no LAD, no thyromegaly CVS: Normal S1, S2, no m/r/g Resp: CTAB, no wheezes or rales Abd: Soft, non-tender, non-distended Extrem: Atraumatic, no cyanosis or edema Pulses: 2+ Skin: Warm, dry Neuro: Alert, oriented, appropriate A/P: Pt is a 37 y.o. female who presents for f/u weight loss, enlarged lymph nodes and pelvic pain - Stop phentermine. Discussed other options for medications to help with weight loss vs trial off all meds. Will give handout on Belviq and Contrave - Pelvic and transvaginal US to evaluate for pelvic pain. Discussed with GYN, will hold off on  for now unless there is an abnormality on US 
- Groin US to evaluate lymph nodes 
- CBC 
- Vistaseq profile for strong FH breast/ovarian cancer - RTC prn pending results of above studies. Discussed diagnoses in detail with patient. Medication risks/benefits/side effects discussed with patient. All of the patient's questions were addressed. The patient understands and agrees with our plan of care. The patient knows to call back if they are unsure of or forget any changes we discussed today or if the symptoms change. The patient received an After-Visit Summary which contains VS, orders, medication list and allergy list. This can be used as a \"mini-medical record\" should they have to seek medical care while out of town. Current Outpatient Prescriptions on File Prior to Visit Medication Sig Dispense Refill  phentermine 37.5 mg capsule Take 37.5 mg by mouth every morning. Max Daily Amount: 37.5 mg. 30 Cap 3  
 hydrOXYzine HCl (ATARAX) 25 mg tablet Take 1 Tab by mouth three (3) times daily as needed for Itching. 90 Tab 0  
 esomeprazole (NEXIUM) 40 mg capsule Take  by mouth daily.  diazePAM (VALIUM) 2 mg tablet Take 1 Tab by mouth every twelve (12) hours as needed for Anxiety or Sleep. Max Daily Amount: 4 mg. 60 Tab 0 No current facility-administered medications on file prior to visit.

## 2018-09-06 NOTE — MR AVS SNAPSHOT
Jose Zhang 
 
 
 [address] [address] 
[phone] Patient: Brian Beltran MRN: [id number] [id number] Visit Information Date & Time Provider Department Dept. Phone Encounter #  
 9/6/2018  2:10 PM Karlee Aguirre  [facility] [phone] [id number] Follow-up Instructions Return if symptoms worsen or fail to improve. Upcoming Health Maintenance Date Due DTaP/Tdap/Td series (1 - Tdap) 2/10/1994 PAP AKA CERVICAL CYTOLOGY 2/10/1994 Influenza Age 5 to Adult 8/1/2018 Allergies as of 9/6/2018  Review Complete On: 9/6/2018 By: Kenneth Chen LPN Severity Noted Reaction Type Reactions Bactrim [Sulfamethoprim Ds]  01/23/2017    Hives Shellfish Derived  06/19/2017    Hives, Itching Current Immunizations  Reviewed on 9/29/2016 Name Date Influenza Vaccine 10/3/2017, 9/29/2016 Not reviewed this visit You Were Diagnosed With   
  
 Codes Comments Pelvic pain    -  Primary ICD-10-CM: R10.2 ICD-9-CM: [id number] Family history of ovarian cancer     ICD-10-CM: Z80.41 ICD-9-CM: V16.41 Enlarged lymph nodes     ICD-10-CM: R59.9 ICD-9-CM: 352. 6 Elevated BP without diagnosis of hypertension     ICD-10-CM: R03.0 ICD-9-CM: 796.2 Family history of BRCA2 gene positive     ICD-10-CM: Z84.81 ICD-9-CM: V18.9 Vitals BP Pulse Temp Resp Height(growth percentile) Weight(growth percentile) (!) 138/96 (BP 1 Location: Right arm, BP Patient Position: Sitting) (!) 110 97.2 °F (36.2 °C) (Oral) 16 5' 3\" (1.6 m) 210 lb (95.3 kg) SpO2 BMI OB Status Smoking Status 98% 37.2 kg/m2 Having regular periods Never Smoker Vitals History BMI and BSA Data Body Mass Index Body Surface Area  
 37.2 kg/m 2 2.06 m 2 Preferred Pharmacy Pharmacy Name Phone  [facility] [address] [place] [address] [phone] Your Updated Medication List  
  
   
This list is accurate as of 9/6/18  4:32 PM.  Always use your most recent med list.  
  
  
  
  
 diazePAM 2 mg tablet Commonly known as:  VALIUM Take 1 Tab by mouth every twelve (12) hours as needed for Anxiety or Sleep. Max Daily Amount: 4 mg.  
  
 hydrOXYzine HCl 25 mg tablet Commonly known as:  ATARAX Take 1 Tab by mouth three (3) times daily as needed for Itching. NexIUM 40 mg capsule Generic drug:  esomeprazole Take  by mouth daily. phentermine 37.5 mg capsule Take 37.5 mg by mouth every morning. Max Daily Amount: 37.5 mg. We Performed the Following CBC W/O DIFF [28128 CPT(R)] VISTASEQ BREAST CANCER PROFILE [[id number] Custom] Follow-up Instructions Return if symptoms worsen or fail to improve. To-Do List   
 09/06/2018 Imaging:  US EXT NONVAS LT COMP   
  
 09/06/2018 Imaging:  US EXT NONVAS RT COMP   
  
 09/06/2018 Imaging:  US PELV NON OBS   
  
 09/06/2018 Imaging:  US TRANSVAGINAL Patient Instructions Lorcaserin (By mouth) Lorcaserin (garfield-KA-ser-in) Used in combination with diet and exercise to help you reach and maintain a healthy weight. Brand Name(s): Belviq There may be other brand names for this medicine. When This Medicine Should Not Be Used: Do not use this medicine if you have had an allergic reaction to lorcaserin, or if you are pregnant. How to Use This Medicine:  
Tablet · Your doctor will tell you how much medicine to use. Do not use more than directed. · Carefully follow your doctor's instructions for a reduced-calorie diet plan and regular exercise. Talk with your doctor before starting any exercise program. 
· Stop taking this medicine if you do not lose 5% of your weight within the first 12 weeks of treatment. · Read and follow the patient instructions that come with this medicine. Talk to your doctor or pharmacist if you have any questions. If a dose is missed: · Take a dose as soon as you remember. If it is almost time for your next dose, wait until then and take a regular dose. Do not take extra medicine to make up for a missed dose. How to Store and Dispose of This Medicine: · Store the medicine in a closed container at room temperature, away from heat, moisture, and direct light. · Ask your pharmacist, doctor, or health caregiver about the best way to dispose of any outdated medicine or medicine no longer needed. · Keep all medicine out of the reach of children. Never share your medicine with anyone. Drugs and Foods to Avoid: Ask your doctor or pharmacist before using any other medicine, including over-the-counter medicines, vitamins, and herbal products. · Make sure your doctor knows if you are also using [place]den's wort, cabergoline (Dostinex®), dextromethorphan (Benylin®, Robitussin DM®), linezolid (Zyvox®), lithium (Eskalith®, Lithobid®), tramadol (Ultram®), tryptophan, medicine to treat depression (such as amitriptyline, bupropion, citalopram, fluoxetine, nortriptyline, sertraline, Cymbalta®, Effexor®, Elavil®, Lexapro®, Pristiq®, Wellbutrin®), an MAO inhibitor (such as Eldepryl®, Marplan®, Nardil®, Parnate®), medicine to treat migraine headache (such as sumatriptan, Frova®, Maxalt®, Relpax®, Zomig®), any medicine to treat mental illness, or a phenothiazine medicine (such as promethazine, Phenergan®, Thorazine®). · Tell your doctor if you are also using insulin or diabetes medicine that you take by mouth (such as glimepiride, glipizide, glyburide, Amaryl®), medicine for erectile dysfunction (such as sildenafil, tadalafil, vardenafil, Cialis®, Levitra®, Viagra®), or any other medicine to help you lose weight (such as phentermine, Suprenza). Warnings While Using This Medicine: · It is not safe to take this medicine during pregnancy.  It could harm an unborn baby. Tell your doctor right away if you become pregnant. · Make sure your doctor knows if you are breastfeeding, or if you have kidney or liver disease, depression, diabetes, high prolactin in the blood, or mental illness. Tell your doctor if you have a blood disorder, such as leukemia, multiple myeloma, or sickle cell anemia. · Tell your doctor if you have a heart problem, such as congestive heart failure, heart valve disease, heart block, or a slow heartbeat. · This medicine may cause serious conditions, such as serotonin syndrome or neuroleptic malignant syndrome (NMS), when taken with certain medicines. Call your doctor right away if you have anxiety, restlessness, fast heartbeat, fever, sweating, muscle spasms, twitching, nausea, vomiting, diarrhea, or seeing or hearing things that are not there. · Tell your doctor if you have erectile dysfunction, a penis with an abnormal shape, or if you have ever had a prolonged erection. This medicine may cause you to have an erection that lasts longer than 4 hours. Stop taking this medicine and call your doctor right away, or go to the emergency room if this happens. · Tell your doctor right away if you have rapid weight gain, swelling of the ankles, feet, or lower legs, or trouble breathing. These could be symptoms of a heart problem. · This medicine may make you dizzy or drowsy. Avoid driving, using machines, or doing anything else that could be dangerous until you know how this medicine affects you. · Tell your doctor if you have any strange thoughts or behavior while you are taking this medicine, such as confusion, worsened depression, hallucinations (seeing, hearing, or feeling things that are not there), suicidal thoughts, or unusual excitement, nervousness, or anger. · Your doctor will do lab tests at regular visits to check on the effects of this medicine. Keep all appointments. · Diabetes patients: This medicine may achieve weight loss, which could improve your diabetes. However, weight loss may also increase your risk of hypoglycemia (low blood sugar). Check your blood sugar more often, both before and during treatment with this medicine. Tell your doctor if you notice any changes in your blood sugar levels. Possible Side Effects While Using This Medicine:  
Call your doctor right away if you notice any of these side effects: · Allergic reaction: Itching or hives, swelling in your face or hands, swelling or tingling in your mouth or throat, chest tightness, trouble breathing · Agitation, confusion, memory loss, restlessness · Anxiety, restlessness, fast heartbeat, fever, sweating, muscle spasms, twitching, nausea, vomiting, diarrhea, seeing or hearing things that are not there · Dizziness, tiredness, headache, weakness, faint · Fast, slow, or uneven heartbeat · Fever, sweating, confusion, uneven heartbeat, muscle stiffness · Painful or prolonged erection of the penis that lasts longer than 4 hours · Rapid weight gain, swelling of the ankles, feet, or lower legs · Trouble breathing · Unusual bleeding, bruising, or weakness · Unusual mood or behavior, extreme good mood If you notice these less serious side effects, talk with your doctor: · Back pain · Cough · Dry mouth 
· Headache · Swelling of the breasts or milky discharge from breasts · Tiredness If you notice other side effects that you think are caused by this medicine, tell your doctor. Call your doctor for medical advice about side effects. You may report side effects to FDA at 2-868-FDA-3195 © 2017 [address] Information is for End User's use only and may not be sold, redistributed or otherwise used for commercial purposes. The above information is an  only. It is not intended as medical advice for individual conditions or treatments.  Talk to your doctor, nurse or pharmacist before following any medical regimen to see if it is safe and effective for you. Naltrexone/Bupropion (By mouth) Bupropion Hydrochloride (dfj-ZTIW-fhg-on collins-droe-KLOR-ernesto), Naltrexone Hydrochloride (nal-TREX-one collins-droe-KLOR-ernesto) Used with diet and exercise to help you lose weight. Brand Name(s): Contrave There may be other brand names for this medicine. When This Medicine Should Not Be Used: This medicine is not right for everyone. Do not use it if you had an allergic reaction to naltrexone or bupropion, you are pregnant, or you have seizures, anorexia, or bulimia. How to Use This Medicine:  
Long Acting Tablet · Take your medicine as directed. Your dose may need to be changed several times to find what works best for you. · Swallow the extended-release tablet whole. Do not crush, break, or chew it. · It is best to take this medicine with food or milk. However, do not take this medicine with high-fat meals. This may increase your risk of seizures. · This medicine should come with a Medication Guide. Ask your pharmacist for a copy if you do not have one. · Missed dose: Skip the missed dose and go back to your regular dosing schedule. Never take extra medicine to make up for a missed dose. · Store the medicine in a closed container at room temperature, away from heat, moisture, and direct light. Drugs and Foods to Avoid: Ask your doctor or pharmacist before using any other medicine, including over-the-counter medicines, vitamins, and herbal products. · Do not use this medicine and an MAO inhibitor (MAOI) within 14 days of each other. Do not take this medicine if you are using or have used heroin or other narcotic drugs (including buprenorphine, codeine, methadone, or other habit-forming painkillers) within the past 7 to 10 days.  Do not use naltrexone/bupropion if you are also using Zyban® to quit smoking or Aplenzin® or Wellbutrin® for depression, because they also contain bupropion. · Tell your doctor if you take barbiturates, benzodiazepines, antiseizure medicine, or sedatives, or if you have recently stopped taking them. · Some medicines and foods can affect how naltrexone/bupropion works. Tell your doctor if you use any of the following: ¨ Amantadine, amiloride, cimetidine, clopidogrel, dopamine, famotidine, levodopa, memantine, metformin, metoprolol, oxaliplatin, pindolol, ranitidine, theophylline, ticlopidine, varenicline ¨ Insulin or diabetes medicine ¨ Medicine to treat depression ¨ Medicine to treat mental illness (including haloperidol, risperidone, thioridazine) ¨ Medicine to treat heart rhythm problems (including flecainide, procainamide, propafenone) ¨ Medicine to treat HIV or AIDS (including efavirenz, lopinavir, ritonavir) ¨ Medicine for pain, diarrhea, cough, or colds [place] Steroid medicine · Do not drink alcohol while you are using this medicine. Warnings While Using This Medicine: · It is not safe to take this medicine during pregnancy. It could harm an unborn baby. Tell your doctor right away if you become pregnant. · Do not breastfeed while you are using this medicine, unless your doctor says it is okay. · Tell your doctor if you have kidney disease, liver disease, diabetes, glaucoma, heart disease, mental problems (including bipolar disorder), or high blood pressure. Tell your doctor if you have a history of drug addiction or if you drink alcohol. · For some children, teenagers, and young adults, this medicine may increase mental or emotional problems. This may lead to thoughts of suicide and violence. Talk with your doctor right away if you have any thoughts or behavior changes that concern you. Tell your doctor if you or anyone in your family has a history of bipolar disorder or suicide attempts. · This medicine may cause the following problems: 
¨ Increased risk of seizures ¨ High blood pressure or heart rate ¨ Serious allergic and skin reactions ¨ Liver problems, including hepatitis ¨ Changes in mood or behavior ¨ Increased risk of hypoglycemia (low blood sugar) in patients with diabetes · You have a higher risk of accidental overdose, serious injury, or death if you use heroin or any other narcotic medicine while you are being treated with this medicine. Also, naltrexone prevents you from feeling the effects of heroin if you use it. · Do not stop using this medicine suddenly. Your doctor will need to slowly decrease your dose before you stop it completely. · Tell any doctor or dentist who treats you that you are using this medicine. This medicine may affect certain medical test results. · Your doctor will check your progress and the effects of this medicine at regular visits. Keep all appointments. · Keep all medicine out of the reach of children. Never share your medicine with anyone. Possible Side Effects While Using This Medicine:  
Call your doctor right away if you notice any of these side effects: · Allergic reaction: Itching or hives, swelling in your face or hands, swelling or tingling in your mouth or throat, chest tightness, trouble breathing · Blistering, peeling, red skin rash · Chest pain, trouble breathing, fast, slow, or pounding heartbeat · Dark urine or pale stools, nausea, vomiting, loss of appetite, stomach pain, yellow skin or eyes · Eye pain, vision changes, seeing halos around lights · Muscle or joint pain, fever with rash · Seeing or hearing things that are not there, feeling like people are against you · Seizures · Sudden increase in energy, racing thoughts, trouble sleeping · Thoughts of hurting yourself, worsening depression, severe agitation or confusion If you notice these less serious side effects, talk with your doctor: · Dry mouth 
· Headache, dizziness · Nausea, constipation, diarrhea If you notice other side effects that you think are caused by this medicine, tell your doctor. Call your doctor for medical advice about side effects. You may report side effects to FDA at 6-524-UEY-3556 © 2017 [address] [employer] Information is for End User's use only and may not be sold, redistributed or otherwise used for commercial purposes. The above information is an  only. It is not intended as medical advice for individual conditions or treatments. Talk to your doctor, nurse or pharmacist before following any medical regimen to see if it is safe and effective for you. Introducing \[facility] [place] [facility]\"" & HEALTH SERVICES! Dear Anna Stevens: 
Thank you for requesting a [employer] account. Our records indicate that you already have an active [employer] account. You can access your account anytime at [email or web address] Did you know that you can access your hospital and ER discharge instructions at any time in [employer]? You can also review all of your test results from your hospital stay or ER visit. Additional Information If you have questions, please visit the Frequently Asked Questions section of the [employer] website at [email or web address] Remember, [employer] is NOT to be used for urgent needs. For medical emergencies, dial 911. Now available from your iPhone and Android! Please provide this summary of care documentation to your next provider. Your primary care clinician is listed as [address] If you have any questions after today's visit, please call [phone].

## 2018-09-06 NOTE — PATIENT INSTRUCTIONS
Lorcaserin (By mouth) Lorcaserin (garfield-KA-ser-in) Used in combination with diet and exercise to help you reach and maintain a healthy weight. Brand Name(s): Belviq There may be other brand names for this medicine. When This Medicine Should Not Be Used: Do not use this medicine if you have had an allergic reaction to lorcaserin, or if you are pregnant. How to Use This Medicine:  
Tablet · Your doctor will tell you how much medicine to use. Do not use more than directed. · Carefully follow your doctor's instructions for a reduced-calorie diet plan and regular exercise. Talk with your doctor before starting any exercise program. 
· Stop taking this medicine if you do not lose 5% of your weight within the first 12 weeks of treatment. · Read and follow the patient instructions that come with this medicine. Talk to your doctor or pharmacist if you have any questions. If a dose is missed: · Take a dose as soon as you remember. If it is almost time for your next dose, wait until then and take a regular dose. Do not take extra medicine to make up for a missed dose. How to Store and Dispose of This Medicine: · Store the medicine in a closed container at room temperature, away from heat, moisture, and direct light. · Ask your pharmacist, doctor, or health caregiver about the best way to dispose of any outdated medicine or medicine no longer needed. · Keep all medicine out of the reach of children. Never share your medicine with anyone. Drugs and Foods to Avoid: Ask your doctor or pharmacist before using any other medicine, including over-the-counter medicines, vitamins, and herbal products.  
· Make sure your doctor knows if you are also using Camden's wort, cabergoline (Dostinex®), dextromethorphan (Benylin®, Robitussin DM®), linezolid (Zyvox®), lithium (Eskalith®, Lithobid®), tramadol (Ultram®), tryptophan, medicine to treat depression (such as amitriptyline, bupropion, citalopram, fluoxetine, nortriptyline, sertraline, Cymbalta®, Effexor®, Elavil®, Lexapro®, Pristiq®, Wellbutrin®), an MAO inhibitor (such as Eldepryl®, Marplan®, Nardil®, Parnate®), medicine to treat migraine headache (such as sumatriptan, Frova®, Maxalt®, Relpax®, Zomig®), any medicine to treat mental illness, or a phenothiazine medicine (such as promethazine, Phenergan®, Thorazine®). · Tell your doctor if you are also using insulin or diabetes medicine that you take by mouth (such as glimepiride, glipizide, glyburide, Amaryl®), medicine for erectile dysfunction (such as sildenafil, tadalafil, vardenafil, Cialis®, Levitra®, Viagra®), or any other medicine to help you lose weight (such as phentermine, Suprenza). Warnings While Using This Medicine: · It is not safe to take this medicine during pregnancy. It could harm an unborn baby. Tell your doctor right away if you become pregnant. · Make sure your doctor knows if you are breastfeeding, or if you have kidney or liver disease, depression, diabetes, high prolactin in the blood, or mental illness. Tell your doctor if you have a blood disorder, such as leukemia, multiple myeloma, or sickle cell anemia. · Tell your doctor if you have a heart problem, such as congestive heart failure, heart valve disease, heart block, or a slow heartbeat. · This medicine may cause serious conditions, such as serotonin syndrome or neuroleptic malignant syndrome (NMS), when taken with certain medicines. Call your doctor right away if you have anxiety, restlessness, fast heartbeat, fever, sweating, muscle spasms, twitching, nausea, vomiting, diarrhea, or seeing or hearing things that are not there. · Tell your doctor if you have erectile dysfunction, a penis with an abnormal shape, or if you have ever had a prolonged erection. This medicine may cause you to have an erection that lasts longer than 4 hours. Stop taking this medicine and call your doctor right away, or go to the emergency room if this happens. · Tell your doctor right away if you have rapid weight gain, swelling of the ankles, feet, or lower legs, or trouble breathing. These could be symptoms of a heart problem. · This medicine may make you dizzy or drowsy. Avoid driving, using machines, or doing anything else that could be dangerous until you know how this medicine affects you. · Tell your doctor if you have any strange thoughts or behavior while you are taking this medicine, such as confusion, worsened depression, hallucinations (seeing, hearing, or feeling things that are not there), suicidal thoughts, or unusual excitement, nervousness, or anger. · Your doctor will do lab tests at regular visits to check on the effects of this medicine. Keep all appointments. · Diabetes patients: This medicine may achieve weight loss, which could improve your diabetes. However, weight loss may also increase your risk of hypoglycemia (low blood sugar). Check your blood sugar more often, both before and during treatment with this medicine. Tell your doctor if you notice any changes in your blood sugar levels. Possible Side Effects While Using This Medicine:  
Call your doctor right away if you notice any of these side effects: · Allergic reaction: Itching or hives, swelling in your face or hands, swelling or tingling in your mouth or throat, chest tightness, trouble breathing · Agitation, confusion, memory loss, restlessness · Anxiety, restlessness, fast heartbeat, fever, sweating, muscle spasms, twitching, nausea, vomiting, diarrhea, seeing or hearing things that are not there · Dizziness, tiredness, headache, weakness, faint · Fast, slow, or uneven heartbeat · Fever, sweating, confusion, uneven heartbeat, muscle stiffness · Painful or prolonged erection of the penis that lasts longer than 4 hours · Rapid weight gain, swelling of the ankles, feet, or lower legs · Trouble breathing · Unusual bleeding, bruising, or weakness · Unusual mood or behavior, extreme good mood If you notice these less serious side effects, talk with your doctor: · Back pain · Cough · Dry mouth 
· Headache · Swelling of the breasts or milky discharge from breasts · Tiredness If you notice other side effects that you think are caused by this medicine, tell your doctor. Call your doctor for medical advice about side effects. You may report side effects to FDA at 6-053-FDA-0363 © 2017 [address] Information is for End User's use only and may not be sold, redistributed or otherwise used for commercial purposes. The above information is an  only. It is not intended as medical advice for individual conditions or treatments. Talk to your doctor, nurse or pharmacist before following any medical regimen to see if it is safe and effective for you. Naltrexone/Bupropion (By mouth) Bupropion Hydrochloride (kcc-GAZQ-ipg-on collins-droe-KLOR-ernesto), Naltrexone Hydrochloride (nal-TREX-one collins-droe-KLOR-ernesto) Used with diet and exercise to help you lose weight. Brand Name(s): Contrave There may be other brand names for this medicine. When This Medicine Should Not Be Used: This medicine is not right for everyone. Do not use it if you had an allergic reaction to naltrexone or bupropion, you are pregnant, or you have seizures, anorexia, or bulimia. How to Use This Medicine:  
Long Acting Tablet · Take your medicine as directed. Your dose may need to be changed several times to find what works best for you. · Swallow the extended-release tablet whole. Do not crush, break, or chew it. · It is best to take this medicine with food or milk. However, do not take this medicine with high-fat meals. This may increase your risk of seizures. · This medicine should come with a Medication Guide. Ask your pharmacist for a copy if you do not have one. · Missed dose: Skip the missed dose and go back to your regular dosing schedule. Never take extra medicine to make up for a missed dose. · Store the medicine in a closed container at room temperature, away from heat, moisture, and direct light. Drugs and Foods to Avoid: Ask your doctor or pharmacist before using any other medicine, including over-the-counter medicines, vitamins, and herbal products. · Do not use this medicine and an MAO inhibitor (MAOI) within 14 days of each other. Do not take this medicine if you are using or have used heroin or other narcotic drugs (including buprenorphine, codeine, methadone, or other habit-forming painkillers) within the past 7 to 10 days. Do not use naltrexone/bupropion if you are also using Zyban® to quit smoking or Aplenzin® or Wellbutrin® for depression, because they also contain bupropion. · Tell your doctor if you take barbiturates, benzodiazepines, antiseizure medicine, or sedatives, or if you have recently stopped taking them. · Some medicines and foods can affect how naltrexone/bupropion works. Tell your doctor if you use any of the following: ¨ Amantadine, amiloride, cimetidine, clopidogrel, dopamine, famotidine, levodopa, memantine, metformin, metoprolol, oxaliplatin, pindolol, ranitidine, theophylline, ticlopidine, varenicline ¨ Insulin or diabetes medicine ¨ Medicine to treat depression ¨ Medicine to treat mental illness (including haloperidol, risperidone, thioridazine) ¨ Medicine to treat heart rhythm problems (including flecainide, procainamide, propafenone) ¨ Medicine to treat HIV or AIDS (including efavirenz, lopinavir, ritonavir) ¨ Medicine for pain, diarrhea, cough, or colds AngelinaDings Steroid medicine · Do not drink alcohol while you are using this medicine. Warnings While Using This Medicine: · It is not safe to take this medicine during pregnancy. It could harm an unborn baby. Tell your doctor right away if you become pregnant. · Do not breastfeed while you are using this medicine, unless your doctor says it is okay. · Tell your doctor if you have kidney disease, liver disease, diabetes, glaucoma, heart disease, mental problems (including bipolar disorder), or high blood pressure. Tell your doctor if you have a history of drug addiction or if you drink alcohol. · For some children, teenagers, and young adults, this medicine may increase mental or emotional problems. This may lead to thoughts of suicide and violence. Talk with your doctor right away if you have any thoughts or behavior changes that concern you. Tell your doctor if you or anyone in your family has a history of bipolar disorder or suicide attempts. · This medicine may cause the following problems: 
¨ Increased risk of seizures ¨ High blood pressure or heart rate ¨ Serious allergic and skin reactions ¨ Liver problems, including hepatitis ¨ Changes in mood or behavior ¨ Increased risk of hypoglycemia (low blood sugar) in patients with diabetes · You have a higher risk of accidental overdose, serious injury, or death if you use heroin or any other narcotic medicine while you are being treated with this medicine. Also, naltrexone prevents you from feeling the effects of heroin if you use it. · Do not stop using this medicine suddenly. Your doctor will need to slowly decrease your dose before you stop it completely. · Tell any doctor or dentist who treats you that you are using this medicine. This medicine may affect certain medical test results. · Your doctor will check your progress and the effects of this medicine at regular visits. Keep all appointments. · Keep all medicine out of the reach of children. Never share your medicine with anyone. Possible Side Effects While Using This Medicine: Call your doctor right away if you notice any of these side effects: · Allergic reaction: Itching or hives, swelling in your face or hands, swelling or tingling in your mouth or throat, chest tightness, trouble breathing · Blistering, peeling, red skin rash · Chest pain, trouble breathing, fast, slow, or pounding heartbeat · Dark urine or pale stools, nausea, vomiting, loss of appetite, stomach pain, yellow skin or eyes · Eye pain, vision changes, seeing halos around lights · Muscle or joint pain, fever with rash · Seeing or hearing things that are not there, feeling like people are against you · Seizures · Sudden increase in energy, racing thoughts, trouble sleeping · Thoughts of hurting yourself, worsening depression, severe agitation or confusion If you notice these less serious side effects, talk with your doctor: · Dry mouth 
· Headache, dizziness · Nausea, constipation, diarrhea If you notice other side effects that you think are caused by this medicine, tell your doctor. Call your doctor for medical advice about side effects. You may report side effects to FDA at 7-962-FDA-8873 © 2017 [facility] Information is for End User's use only and may not be sold, redistributed or otherwise used for commercial purposes. The above information is an  only. It is not intended as medical advice for individual conditions or treatments. Talk to your doctor, nurse or pharmacist before following any medical regimen to see if it is safe and effective for you.

## 2018-09-14 ENCOUNTER — HOSPITAL ENCOUNTER (OUTPATIENT)
Dept: ULTRASOUND IMAGING | Age: 43
Discharge: HOME | End: 2018-09-14
Attending: FAMILY MEDICINE
Payer: COMMERCIAL

## 2018-09-14 DIAGNOSIS — R59.9 ENLARGED LYMPH NODES: ICD-10-CM

## 2018-09-14 DIAGNOSIS — Z80.41 FAMILY HISTORY OF OVARIAN CANCER: ICD-10-CM

## 2018-09-14 PROCEDURE — 76830 TRANSVAGINAL US NON-OB: CPT

## 2018-09-14 PROCEDURE — 76856 US EXAM PELVIC COMPLETE: CPT

## 2018-09-14 PROCEDURE — 76882 US LMTD JT/FCL EVL NVASC XTR: CPT

## 2018-09-18 ENCOUNTER — TELEPHONE (OUTPATIENT)
Dept: FAMILY MEDICINE CLINIC | Age: 43
End: 2018-09-18

## 2018-09-18 NOTE — TELEPHONE ENCOUNTER
Discussed normal pelvic and groin US results with patient. Will continue to monitor for changes. All questions answered and pt feels comfortable with the plan of care.

## 2018-10-22 LAB
ADDITIONAL INFORMATION 480297: NORMAL
COUNSELING NOTE: NORMAL
ERYTHROCYTE [DISTWIDTH] IN BLOOD BY AUTOMATED COUNT: 13.5 % (ref 12.3–15.4)
GENE DIS ANL INTERP-IMP: NEGATIVE
HCT VFR BLD AUTO: 40.2 % (ref 34–46.6)
HGB BLD-MCNC: 14 G/DL (ref 11.1–15.9)
LAB DIRECTOR NAME PROVIDER: NORMAL
Lab: NORMAL
MCH RBC QN AUTO: 30.8 PG (ref 26.6–33)
MCHC RBC AUTO-ENTMCNC: 34.8 G/DL (ref 31.5–35.7)
MCV RBC AUTO: 88 FL (ref 79–97)
PLATELET # BLD AUTO: 311 X10E3/UL (ref 150–379)
RBC # BLD AUTO: 4.55 X10E6/UL (ref 3.77–5.28)
REF LAB TEST METHOD: NORMAL
REFERENCES: NORMAL
SPECIMEN SOURCE: NORMAL
WBC # BLD AUTO: 8.4 X10E3/UL (ref 3.4–10.8)

## 2018-10-23 ENCOUNTER — TELEPHONE (OUTPATIENT)
Dept: FAMILY MEDICINE CLINIC | Age: 43
End: 2018-10-23

## 2018-10-23 NOTE — TELEPHONE ENCOUNTER
Discussed negative results of Vistaseq with patient. Great news! All questions answered and pt feels comfortable with the plan of care.

## 2018-12-06 DIAGNOSIS — K21.9 GASTROESOPHAGEAL REFLUX DISEASE, ESOPHAGITIS PRESENCE NOT SPECIFIED: Primary | ICD-10-CM

## 2018-12-06 RX ORDER — ESOMEPRAZOLE MAGNESIUM 40 MG/1
40 CAPSULE, DELAYED RELEASE ORAL DAILY
Qty: 90 CAP | Refills: 3 | Status: SHIPPED | OUTPATIENT
Start: 2018-12-06 | End: 2018-12-11 | Stop reason: SDUPTHER

## 2018-12-11 DIAGNOSIS — K21.9 GASTROESOPHAGEAL REFLUX DISEASE, ESOPHAGITIS PRESENCE NOT SPECIFIED: ICD-10-CM

## 2018-12-11 RX ORDER — ESOMEPRAZOLE MAGNESIUM 40 MG/1
40 CAPSULE, DELAYED RELEASE ORAL DAILY
Qty: 90 CAP | Refills: 3 | Status: SHIPPED | OUTPATIENT
Start: 2018-12-11 | End: 2019-03-08 | Stop reason: SDUPTHER

## 2019-01-21 ENCOUNTER — OFFICE VISIT (OUTPATIENT)
Dept: FAMILY MEDICINE CLINIC | Age: 44
End: 2019-01-21

## 2019-01-21 VITALS
TEMPERATURE: 97.4 F | HEIGHT: 63 IN | HEART RATE: 104 BPM | BODY MASS INDEX: 38.8 KG/M2 | WEIGHT: 219 LBS | SYSTOLIC BLOOD PRESSURE: 132 MMHG | OXYGEN SATURATION: 100 % | RESPIRATION RATE: 16 BRPM | DIASTOLIC BLOOD PRESSURE: 82 MMHG

## 2019-01-21 DIAGNOSIS — E66.09 CLASS 2 OBESITY DUE TO EXCESS CALORIES WITHOUT SERIOUS COMORBIDITY WITH BODY MASS INDEX (BMI) OF 38.0 TO 38.9 IN ADULT: Primary | ICD-10-CM

## 2019-01-21 RX ORDER — PHENTERMINE HYDROCHLORIDE 37.5 MG/1
37.5 TABLET ORAL
Qty: 30 TAB | Refills: 3 | Status: SHIPPED | OUTPATIENT
Start: 2019-01-21 | End: 2021-03-29 | Stop reason: ALTCHOICE

## 2019-01-21 NOTE — PROGRESS NOTES
CC: Weight loss counseling HPI: Pt is a 37 y.o. female who presents for weight loss counseling. She had been on phentermine in the past and it worked well for her with no side effects. She lost almost 60lbs with this. She is still working on her diet, but since stopping it has gained almost 10lbs back. She is still dealing with a lot of stress at home from multiple sources but is managing. She uses the Valium very seldomly, only when the stress gets too much to handle and has not had a refill ever since her first rx 7 months ago. Past Medical History:  
Diagnosis Date  GERD (gastroesophageal reflux disease)  Headache   
 Hearing loss  Palpitations PVC's noted in past; workup by Caridology in past [place] Family History Problem Relation Age of Onset  Headache Father  Neuropathy Father  Neuropathy Sister  Cancer Maternal Grandmother  Cancer Paternal Grandmother  Breast Cancer Paternal Grandmother  Cancer Paternal Grandfather  Headache Other  Breast Cancer Paternal Aunt  Breast Cancer Cousin Social History Tobacco Use  Smoking status: Never Smoker  Smokeless tobacco: Never Used Substance Use Topics  Alcohol use: Yes Comment: Rarely  Drug use: No  
 
 
ROS: 
Positive only when bolded Constitutional: HA, F/C, changes in weight Cardiovascular: CP, palpitations (chronic, occ, unchanged) PE: 
Visit Vitals /82 (BP 1 Location: Right arm, BP Patient Position: Sitting) Pulse (!) 104 Temp 97.4 °F (36.3 °C) (Oral) Resp 16 Ht 5' 3\" (1.6 m) Wt 219 lb (99.3 kg) SpO2 100% BMI 38.79 kg/m² Gen: Pt sitting in chair, in NAD Head: Normocephalic, atraumatic Eyes: Sclera anicteric, EOM grossly intact, PERRL Throat: MMM, normal lips, tongue and gums Neck: Supple CVS: Normal S1, S2, no m/r/g Resp: CTAB, no wheezes or rales Extrem: Atraumatic, no cyanosis or edema Pulses: 2+ Skin: Warm, dry Neuro: Alert, oriented, appropriate A/P: Pt is a 37 y.o. female who presents for weight loss counseling. Discussed diet and options for medications, risks and benefits, extensively. Will plan to do another 3 month trial of phentermine and reassess. Will check her BP weekly in office and if it starts to go up will need to stop phentermine and try a different medication. Handout given on weight loss diet as well. - RTC in 3 months for f/u obesity and will get weekly BP checks until then. I have reviewed/discussed the above normal BMI with the patient. I have recommended the following interventions: prescribed dietary intake . and trial back on phentermine. Discussed diagnoses in detail with patient. Medication risks/benefits/side effects discussed with patient. All of the patient's questions were addressed. The patient understands and agrees with our plan of care. The patient knows to call back if they are unsure of or forget any changes we discussed today or if the symptoms change. The patient received an After-Visit Summary which contains VS, orders, medication list and allergy list. This can be used as a \"mini-medical record\" should they have to seek medical care while out of town. Current Outpatient Medications on File Prior to Visit Medication Sig Dispense Refill  esomeprazole (NEXIUM) 40 mg capsule Take 1 Cap by mouth daily. 90 Cap 3  
 diazePAM (VALIUM) 2 mg tablet Take 1 Tab by mouth every twelve (12) hours as needed for Anxiety or Sleep. Max Daily Amount: 4 mg. 60 Tab 0  
 hydrOXYzine HCl (ATARAX) 25 mg tablet Take 1 Tab by mouth three (3) times daily as needed for Itching. 90 Tab 0 No current facility-administered medications on file prior to visit.

## 2019-01-21 NOTE — PROGRESS NOTES
1. Have you been to the ER, urgent care clinic since your last visit? Hospitalized since your last visit? no 
 
2. Have you seen or consulted any other health care providers outside of the [address] [place] [address] since your last visit? Include any pap smears or colon screening. no 
Reviewed record in preparation for visit and have obtained necessary documentation. Patient did not bring medications to visit for review. Information provided on Advanced Directive, Living Will. Body mass index is 38.79 kg/m². Health Maintenance Due Topic Date Due  
 DTaP/Tdap/Td series (1 - Tdap) 02/10/1994  PAP AKA CERVICAL CYTOLOGY  02/10/1994  MEDICARE YEARLY EXAM  01/17/2019

## 2019-01-21 NOTE — PATIENT INSTRUCTIONS
Weight Loss Diet Guidelines Eat ONLY when you are hungry, and stop just before you are full. If you are eating enough fat in your meals, you will feel full for 5-6 hours after, and you can avoid snacks. This is your goal.  
 
Eat More of these Foods: 
 
Meat: Beef, lamb, pork, chicken and others Fish: Any kind of fish Eggs: As many as you want, with the yolk Vegetables: ANY vegetables but limit starchy vegetables like potatoes, corn, carrots or peas Nuts and Seeds: No more than one handful a day High-fat Dairy: Cheese, butter, heavy cream 
 
 
Eat Less of this, but OK Rarely: 
 
Fruits: Berries and Melon are best. May have one handful per day. Limit other fruits, particularly bananas, grapes, mangos, and pineapple DO NOT DRINK ANY JUICE, EVER. Whole grains: Oatmeal, quinoa, brown rice Legumes: Beans, lentils Do Not Eat these Foods: 
 
Drinks with calories: Sodas, fruit juices, sweet tea, sports drinks (gatorade, etc) Sugar: Honey, agave, candy, cake, cookies, ice cream 
Grains: Bread, pasta, crackers, cereal, chips Yogurt: Most yogurt is as bad as candy. Eat only high-fat, plain yogurt, like Fage 2% plain. Trans Fats: \"Hydrogenated\" or \"partially hydrogenated\" oils, margarine \"Diet\" and \"Low fat\" Products Highly processed foods. If it looks like it was made in a factory, don't eat it. If it has more than 5 ingredients, it is processed. What Should Meals Look Like? Breakfast: Cecily Caroline, eggs, sausage or plain yogurt with berries Lunch: Big salad with meat, cheese, avocado, homemade dressing or olive oil and vinegar. Or meat, chicken, fish and vegetables. Dinner: Meat, chicken or fish and vegetables, or salad, like above Snack: Berries, cheese, nuts Drinks:Plenty of water. May also have coffee, tea, or artificially sweetened beverages (but not too many)

## 2019-02-13 ENCOUNTER — TELEPHONE (OUTPATIENT)
Dept: FAMILY MEDICINE CLINIC | Age: 44
End: 2019-02-13

## 2019-02-13 DIAGNOSIS — R09.89 CHEST CONGESTION: ICD-10-CM

## 2019-02-13 DIAGNOSIS — B37.9 ANTIBIOTIC-INDUCED YEAST INFECTION: ICD-10-CM

## 2019-02-13 DIAGNOSIS — R05.9 COUGH IN ADULT: Primary | ICD-10-CM

## 2019-02-13 DIAGNOSIS — T36.95XA ANTIBIOTIC-INDUCED YEAST INFECTION: ICD-10-CM

## 2019-02-13 RX ORDER — PREDNISONE 20 MG/1
40 TABLET ORAL
Qty: 10 TAB | Refills: 0 | Status: SHIPPED | OUTPATIENT
Start: 2019-02-13 | End: 2019-02-18

## 2019-02-13 RX ORDER — AZITHROMYCIN 250 MG/1
TABLET, FILM COATED ORAL
Qty: 6 TAB | Refills: 0 | Status: SHIPPED | OUTPATIENT
Start: 2019-02-13 | End: 2019-02-18

## 2019-02-13 RX ORDER — FLUCONAZOLE 150 MG/1
150 TABLET ORAL DAILY
Qty: 1 TAB | Refills: 0 | Status: SHIPPED | OUTPATIENT
Start: 2019-02-13 | End: 2019-02-14

## 2019-02-13 NOTE — TELEPHONE ENCOUNTER
Patient called stating chest rattling, cough (productive but can not clear secretions). Denies fever, SOB, CP. Denies sick contacts. Hx of pneumonia ~5 years ago. Strict ED parameters discussed. Patient aware and in agreement with prescribed medication (prednisone and azithromycin) and OTC Mucinex DM BID for the next 7 days. Also prescibed diflucan for antibiotic associated yeast infection.

## 2019-03-08 DIAGNOSIS — K21.9 GASTROESOPHAGEAL REFLUX DISEASE, ESOPHAGITIS PRESENCE NOT SPECIFIED: ICD-10-CM

## 2019-03-08 DIAGNOSIS — L50.9 HIVES: ICD-10-CM

## 2019-03-08 RX ORDER — ESOMEPRAZOLE MAGNESIUM 40 MG/1
40 CAPSULE, DELAYED RELEASE ORAL DAILY
Qty: 90 CAP | Refills: 3 | Status: SHIPPED | OUTPATIENT
Start: 2019-03-08 | End: 2019-03-14 | Stop reason: SDUPTHER

## 2019-03-08 RX ORDER — HYDROXYZINE 25 MG/1
25 TABLET, FILM COATED ORAL
Qty: 90 TAB | Refills: 11 | Status: SHIPPED | OUTPATIENT
Start: 2019-03-08 | End: 2019-03-14 | Stop reason: SDUPTHER

## 2019-03-14 DIAGNOSIS — L50.9 HIVES: ICD-10-CM

## 2019-03-14 DIAGNOSIS — K21.9 GASTROESOPHAGEAL REFLUX DISEASE, ESOPHAGITIS PRESENCE NOT SPECIFIED: ICD-10-CM

## 2019-03-14 RX ORDER — HYDROXYZINE 25 MG/1
25 TABLET, FILM COATED ORAL
Qty: 90 TAB | Refills: 11 | Status: SHIPPED | OUTPATIENT
Start: 2019-03-14 | End: 2019-03-14 | Stop reason: SDUPTHER

## 2019-03-14 RX ORDER — ESOMEPRAZOLE MAGNESIUM 40 MG/1
40 CAPSULE, DELAYED RELEASE ORAL DAILY
Qty: 30 CAP | Refills: 0 | Status: SHIPPED | OUTPATIENT
Start: 2019-03-14 | End: 2019-12-23 | Stop reason: SDUPTHER

## 2019-03-14 RX ORDER — HYDROXYZINE 25 MG/1
25 TABLET, FILM COATED ORAL
Qty: 30 TAB | Refills: 0 | Status: SHIPPED | OUTPATIENT
Start: 2019-03-14 | End: 2020-03-13 | Stop reason: SDUPTHER

## 2019-03-14 RX ORDER — ESOMEPRAZOLE MAGNESIUM 40 MG/1
40 CAPSULE, DELAYED RELEASE ORAL DAILY
Qty: 90 CAP | Refills: 3 | Status: SHIPPED | OUTPATIENT
Start: 2019-03-14 | End: 2019-03-14 | Stop reason: SDUPTHER

## 2019-03-14 NOTE — TELEPHONE ENCOUNTER
Medications refilled last week but sent to wrong pharmacy. Resent to mail order and 30 day supply sent to local pharmacy so she won't run out.

## 2019-06-12 ENCOUNTER — OFFICE VISIT (OUTPATIENT)
Dept: FAMILY MEDICINE CLINIC | Age: 44
End: 2019-06-12

## 2019-06-12 VITALS
HEART RATE: 95 BPM | TEMPERATURE: 98 F | RESPIRATION RATE: 18 BRPM | HEIGHT: 63 IN | WEIGHT: 219 LBS | SYSTOLIC BLOOD PRESSURE: 127 MMHG | BODY MASS INDEX: 38.8 KG/M2 | OXYGEN SATURATION: 100 % | DIASTOLIC BLOOD PRESSURE: 90 MMHG

## 2019-06-12 DIAGNOSIS — M25.659 IMPAIRED RANGE OF MOTION OF HIP: ICD-10-CM

## 2019-06-12 DIAGNOSIS — M25.551 RIGHT HIP PAIN: Primary | ICD-10-CM

## 2019-06-12 DIAGNOSIS — R52 PAIN: ICD-10-CM

## 2019-06-12 NOTE — PROGRESS NOTES
Allen Blanc  53 y.o. female  1975  [id number] [facility]  [address] [address]  [id number]     [facility]: Progress Note       Encounter Date: 6/12/2019    Chief Complaint   Patient presents with    Hip Pain     right, x2 months     History of Present Illness   Allen Blanc is a 53 y.o. female who presents to clinic today for:    Right Hip Pain-states worsening deep bone pain for 2 months. Pain noted with abduction; reports limited ROM in right leg. Denies-injury, MVA, numbness/tingling in LE, tick bite, back pain, cellulitic or skin issues. Hx of CT for gallbladder surgery and no reported incidental findings. Treatment-OTC aleve. States this pain is different from typical aches and pain. Treatment OTC NSAIDS. Last cigarette 04/20/2019. LMP 06/03/2019. Health Maintenance    Health Maintenance Due   Topic Date Due    DTaP/Tdap/Td series (1 - Tdap) 02/10/1994    PAP AKA CERVICAL CYTOLOGY  02/10/1994     Review of Systems   Review of Systems   Constitutional: Negative. HENT: Negative. Eyes: Negative. Respiratory: Negative. Cardiovascular: Negative. Gastrointestinal: Negative. Genitourinary: Negative. Musculoskeletal: Positive for joint pain. Skin: Negative. Neurological: Negative. Endo/Heme/Allergies: Negative. Psychiatric/Behavioral: Negative. Vitals/Objective:     Vitals:    06/12/19 1035   BP: 127/90   Pulse: 95   Resp: 18   Temp: 98 °F (36.7 °C)   TempSrc: Oral   SpO2: 100%   Weight: 219 lb (99.3 kg)   Height: 5' 3\" (1.6 m)     Body mass index is 38.79 kg/m². Physical Exam   Constitutional: She is oriented to person, place, and time. She appears well-developed and well-nourished. Eyes: Conjunctivae are normal.   Pulmonary/Chest: Effort normal.   Abdominal: Soft. Normal appearance and bowel sounds are normal.   Musculoskeletal:        Right hip: She exhibits decreased range of motion, decreased strength and tenderness.  She exhibits no swelling, no deformity and no laceration. Legs:  Neurological: She is alert and oriented to person, place, and time. Skin: Skin is warm, dry and intact. Psychiatric: She has a normal mood and affect. Her speech is normal and behavior is normal. Judgment and thought content normal. Cognition and memory are normal.       No results found for this or any previous visit (from the past 24 hour(s)). Assessment and Plan:   1. Right hip pain    - XR HIP RT W OR WO PELV 2-3 VWS; Future  - CBC WITH AUTOMATED DIFF    2. Impaired range of motion of hip      3. Pain    Normal imaging. CBC ordered to assess white count. Discussed future ultrasound and ? Ortho referral.    I have discussed the diagnosis with the patient and the intended plan as seen in the above orders. she has expressed understanding. The patient has received an after-visit summary and questions were answered concerning future plans. I have discussed medication side effects and warnings with the patient as well. Electronically Signed: Tiff Bass NP     History/Allergies   Patients past medical, surgical and family histories were reviewed and updated.     Past Medical History:   Diagnosis Date    GERD (gastroesophageal reflux disease)     Headache     Hearing loss     Palpitations     PVC's noted in past; workup by Caridology in past [place]      Past Surgical History:   Procedure Laterality Date    HX DILATION AND CURETTAGE      HX HEENT  1978    adenoids    HX TONSILLECTOMY  1979    NEUROLOGICAL PROCEDURE UNLISTED      subdural hematoma-age 2     Family History   Problem Relation Age of Onset    Headache Father     Neuropathy Father     Neuropathy Sister     Cancer Maternal Grandmother     Cancer Paternal Grandmother     Breast Cancer Paternal Grandmother     Cancer Paternal Grandfather     Headache Other     Breast Cancer Paternal Aunt     Breast Cancer Cousin      Social History     Socioeconomic History    Marital status:      Spouse name: Not on file    Number of children: Not on file    Years of education: Not on file    Highest education level: Not on file   Occupational History    Not on file   Social Needs    Financial resource strain: Not on file    Food insecurity:     Worry: Not on file     Inability: Not on file    Transportation needs:     Medical: Not on file     Non-medical: Not on file   Tobacco Use    Smoking status: Never Smoker    Smokeless tobacco: Never Used   Substance and Sexual Activity    Alcohol use: Yes     Comment: Rarely     Drug use: No    Sexual activity: Yes     Partners: Male     Birth control/protection: None     Comment:     Lifestyle    Physical activity:     Days per week: Not on file     Minutes per session: Not on file    Stress: Not on file   Relationships    Social connections:     Talks on phone: Not on file     Gets together: Not on file     Attends Orthodoxy service: Not on file     Active member of club or organization: Not on file     Attends meetings of clubs or organizations: Not on file     Relationship status: Not on file    Intimate partner violence:     Fear of current or ex partner: Not on file     Emotionally abused: Not on file     Physically abused: Not on file     Forced sexual activity: Not on file   Other Topics Concern    Not on file   Social History Narrative    Not on file         Allergies   Allergen Reactions    Bactrim [Sulfamethoprim Ds] Hives    Shellfish Derived Hives and Itching       Disposition         No future appointments. Current Medications after this visit     Current Outpatient Medications   Medication Sig    esomeprazole (NEXIUM) 40 mg capsule Take 1 Cap by mouth daily.  hydrOXYzine HCl (ATARAX) 25 mg tablet Take 1 Tab by mouth three (3) times daily as needed for Itching.  diazePAM (VALIUM) 2 mg tablet Take 1 Tab by mouth every twelve (12) hours as needed for Anxiety or Sleep. Max Daily Amount: 4 mg.  (Patient taking differently: Take 2 mg by mouth as needed for Anxiety or Sleep.)    phentermine (ADIPEX-P) 37.5 mg tablet Take 1 Tab by mouth every morning. Max Daily Amount: 37.5 mg. No current facility-administered medications for this visit. There are no discontinued medications.

## 2019-06-12 NOTE — LETTER
6/12/2019 11:00 AM 
 
Ms. Brian Joel [address] [address] Dear Brian Medellin: 
 
Please find your most recent results below. Resulted Orders XR HIP RT W OR WO PELV 2-3 VWS (Exam End: 6/12/2019 10:55 AM) Narrative EXAM: XR HIP RT W OR WO PELV 2-3 VWS Clinical history: Worsening right-sided hip pain INDICATION: worsening right hip pain. Mliss Wilkins COMPARISON: None. FINDINGS: An AP view of the pelvis and a frogleg lateral view of the right hip 
demonstrate no fracture, dislocation or other acute abnormality. Impression IMPRESSION: No acute abnormality. Please call me if you have any questions: [phone] Sincerely, Kathryn Pyle NP

## 2019-06-12 NOTE — PROGRESS NOTES
Chief Complaint   Patient presents with    Hip Pain     right, x2 months     Visit Vitals  /90 (BP 1 Location: Right arm, BP Patient Position: Sitting)   Pulse 95   Temp 98 °F (36.7 °C) (Oral)   Resp 18   Ht 5' 3\" (1.6 m)   Wt 219 lb (99.3 kg)   LMP 06/03/2019 (Approximate)   SpO2 100%   BMI 38.79 kg/m²     1. Have you been to the ER, urgent care clinic since your last visit? Hospitalized since your last visit? No    2. Have you seen or consulted any other health care providers outside of the [address] [place] [address] since your last visit? Include any pap smears or colon screening.  No    Reviewed record in preparation for visit and have necessary documentation  Pt did not bring medication to office visit for review  opportunity was given for questions  Goals that were addressed and/or need to be completed during or after this appointment include   Health Maintenance Due   Topic Date Due    DTaP/Tdap/Td series (1 - Tdap) 02/10/1994    PAP AKA CERVICAL CYTOLOGY  02/10/1994

## 2019-06-13 LAB
BASOPHILS # BLD AUTO: 0 X10E3/UL (ref 0–0.2)
BASOPHILS NFR BLD AUTO: 1 %
EOSINOPHIL # BLD AUTO: 0.1 X10E3/UL (ref 0–0.4)
EOSINOPHIL NFR BLD AUTO: 2 %
ERYTHROCYTE [DISTWIDTH] IN BLOOD BY AUTOMATED COUNT: 14 % (ref 12.3–15.4)
HCT VFR BLD AUTO: 40.8 % (ref 34–46.6)
HGB BLD-MCNC: 13.8 G/DL (ref 11.1–15.9)
IMM GRANULOCYTES # BLD AUTO: 0 X10E3/UL (ref 0–0.1)
IMM GRANULOCYTES NFR BLD AUTO: 0 %
LYMPHOCYTES # BLD AUTO: 2.5 X10E3/UL (ref 0.7–3.1)
LYMPHOCYTES NFR BLD AUTO: 43 %
MCH RBC QN AUTO: 30.3 PG (ref 26.6–33)
MCHC RBC AUTO-ENTMCNC: 33.8 G/DL (ref 31.5–35.7)
MCV RBC AUTO: 90 FL (ref 79–97)
MONOCYTES # BLD AUTO: 0.3 X10E3/UL (ref 0.1–0.9)
MONOCYTES NFR BLD AUTO: 5 %
NEUTROPHILS # BLD AUTO: 2.8 X10E3/UL (ref 1.4–7)
NEUTROPHILS NFR BLD AUTO: 49 %
PLATELET # BLD AUTO: 300 X10E3/UL (ref 150–450)
RBC # BLD AUTO: 4.56 X10E6/UL (ref 3.77–5.28)
WBC # BLD AUTO: 5.8 X10E3/UL (ref 3.4–10.8)

## 2019-11-15 ENCOUNTER — TELEPHONE (OUTPATIENT)
Dept: FAMILY MEDICINE CLINIC | Age: 44
End: 2019-11-15

## 2019-11-15 DIAGNOSIS — F43.22 ADJUSTMENT DISORDER WITH ANXIOUS MOOD: Primary | ICD-10-CM

## 2019-11-15 RX ORDER — SERTRALINE HYDROCHLORIDE 25 MG/1
25 TABLET, FILM COATED ORAL DAILY
Qty: 30 TAB | Refills: 3 | Status: SHIPPED | OUTPATIENT
Start: 2019-11-15 | End: 2020-03-09

## 2019-11-15 RX ORDER — CITALOPRAM 10 MG/1
10 TABLET ORAL DAILY
Qty: 30 TAB | Refills: 3 | Status: SHIPPED | OUTPATIENT
Start: 2019-11-15 | End: 2019-11-15 | Stop reason: ALTCHOICE

## 2019-11-15 NOTE — TELEPHONE ENCOUNTER
Pt called stating she is taking Nexium and there is an interaction between Nexium and Celexa. Will switch to Zoloft. Pharmacy notified. All questions answered and pt feels comfortable with the plan of care.

## 2019-12-23 DIAGNOSIS — K21.9 GASTROESOPHAGEAL REFLUX DISEASE, ESOPHAGITIS PRESENCE NOT SPECIFIED: ICD-10-CM

## 2019-12-23 RX ORDER — ESOMEPRAZOLE MAGNESIUM 40 MG/1
40 CAPSULE, DELAYED RELEASE ORAL DAILY
Qty: 90 CAP | Refills: 1 | Status: SHIPPED | OUTPATIENT
Start: 2019-12-23 | End: 2020-06-16

## 2020-03-09 DIAGNOSIS — F43.22 ADJUSTMENT DISORDER WITH ANXIOUS MOOD: ICD-10-CM

## 2020-03-09 RX ORDER — SERTRALINE HYDROCHLORIDE 25 MG/1
TABLET, FILM COATED ORAL
Qty: 30 TAB | Refills: 3 | Status: SHIPPED | OUTPATIENT
Start: 2020-03-09 | End: 2020-07-07

## 2020-03-13 DIAGNOSIS — L50.9 HIVES: ICD-10-CM

## 2020-03-13 RX ORDER — HYDROXYZINE 25 MG/1
25 TABLET, FILM COATED ORAL
Qty: 90 TAB | Refills: 0 | Status: SHIPPED | OUTPATIENT
Start: 2020-03-13 | End: 2020-06-16

## 2020-03-13 NOTE — TELEPHONE ENCOUNTER
Pt in office stating she is out of hydroxyzine, would like a temporary supply sent to [facility] while she is waiting for the mail order one to come in. Rx sent.

## 2020-06-15 DIAGNOSIS — K21.9 GASTROESOPHAGEAL REFLUX DISEASE, ESOPHAGITIS PRESENCE NOT SPECIFIED: ICD-10-CM

## 2020-06-15 DIAGNOSIS — L50.9 HIVES: ICD-10-CM

## 2020-06-16 RX ORDER — ESOMEPRAZOLE MAGNESIUM 40 MG/1
CAPSULE, DELAYED RELEASE ORAL
Qty: 90 CAP | Refills: 3 | Status: SHIPPED | OUTPATIENT
Start: 2020-06-16 | End: 2021-06-23

## 2020-06-16 RX ORDER — HYDROXYZINE 25 MG/1
TABLET, FILM COATED ORAL
Qty: 90 TAB | Refills: 11 | Status: SHIPPED | OUTPATIENT
Start: 2020-06-16 | End: 2021-04-30 | Stop reason: SDUPTHER

## 2020-07-07 DIAGNOSIS — F43.22 ADJUSTMENT DISORDER WITH ANXIOUS MOOD: ICD-10-CM

## 2020-07-07 RX ORDER — SERTRALINE HYDROCHLORIDE 25 MG/1
TABLET, FILM COATED ORAL
Qty: 30 TAB | Refills: 3 | Status: SHIPPED | OUTPATIENT
Start: 2020-07-07 | End: 2020-11-17

## 2020-08-05 ENCOUNTER — OFFICE VISIT (OUTPATIENT)
Dept: FAMILY MEDICINE CLINIC | Age: 45
End: 2020-08-05
Payer: COMMERCIAL

## 2020-08-05 DIAGNOSIS — M25.561 ACUTE PAIN OF RIGHT KNEE: Primary | ICD-10-CM
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
## 2020-08-05 DIAGNOSIS — M79.674 TOE PAIN, RIGHT: ICD-10-CM

## 2020-08-05 PROCEDURE — 99213 OFFICE O/P EST LOW 20 MIN: CPT | Performed by: FAMILY MEDICINE

## 2020-08-05 NOTE — PROGRESS NOTES
1. Have you been to the ER, urgent care clinic since your last visit? Hospitalized since your last visit? No    2. Have you seen or consulted any other health care providers outside of the [address] [place] [address] since your last visit? Include any pap smears or colon screening.  No  Reviewed record in preparation for visit and have necessary documentation  opportunity was given for questions  Goals that were addressed and/or need to be completed during or after this appointment include    Health Maintenance Due   Topic Date Due    DTaP/Tdap/Td series (1 - Tdap) 02/10/1994    PAP AKA CERVICAL CYTOLOGY  02/10/1994    Influenza Age 9 to Adult  08/01/2020

## 2020-08-10 NOTE — PROGRESS NOTES
Patient: Barbie Kerr MRN: [id number]  SSN: [id number]    YOB: 1975  Age: 50 y.o. Sex: female      Chief Complaint   Patient presents with    Fall     3 weeks ago,  toe and knee pain     Barbie Kerr is a 50 y.o. female who sustained a right knee and 5th toe injury 3 week(s) ago. Mechanism of injury: fall. Immediate symptoms: immediate pain, immediate swelling. Symptoms have been persistentr since that time. Prior history of related problems: no prior problems with this area in the past.    Medications:     Current Outpatient Medications   Medication Sig    sertraline (ZOLOFT) 25 mg tablet TAKE 1 TABLET BY MOUTH DAILY    esomeprazole (NEXIUM) 40 mg capsule TAKE 1 CAPSULE DAILY    hydrOXYzine HCL (ATARAX) 25 mg tablet TAKE 1 TABLET 3 TIMES A DAYAS NEEDED FOR ITCHING    phentermine (ADIPEX-P) 37.5 mg tablet Take 1 Tab by mouth every morning. Max Daily Amount: 37.5 mg.    diazePAM (VALIUM) 2 mg tablet Take 1 Tab by mouth every twelve (12) hours as needed for Anxiety or Sleep. Max Daily Amount: 4 mg. (Patient taking differently: Take 2 mg by mouth as needed for Anxiety or Sleep.)     No current facility-administered medications for this visit. Problem List:     Patient Active Problem List    Diagnosis Date Noted    Severe obesity (BMI 35.0-39.9) 09/06/2018    Abnormal EKG 06/21/2017    Preop cardiovascular exam 06/21/2017    Headache 07/14/2016       Medical History:     Past Medical History:   Diagnosis Date    GERD (gastroesophageal reflux disease)     Headache     Hearing loss     Palpitations     PVC's noted in past; workup by Caridology in past [place]       Allergies:      Allergies   Allergen Reactions    Bactrim [Sulfamethoprim Ds] Hives    Shellfish Derived Hives and Itching       Surgical History:     Past Surgical History:   Procedure Laterality Date    HX DILATION AND CURETTAGE      HX HEENT  1978    adenoids    HX TONSILLECTOMY  1979    NEUROLOGICAL PROCEDURE UNLISTED subdural hematoma-age 2       Social History:     Social History     Socioeconomic History    Marital status:      Spouse name: Not on file    Number of children: Not on file    Years of education: Not on file    Highest education level: Not on file   Tobacco Use    Smoking status: Never Smoker    Smokeless tobacco: Never Used   Substance and Sexual Activity    Alcohol use: Yes     Comment: Rarely     Drug use: No    Sexual activity: Yes     Partners: Male     Birth control/protection: None     Comment:         Review of Symptoms:  Constitutional: Negative for fever, malaise  Skin: Negative for rash or lesion  CV: Negative for chest pain or palpitations  Resp: Negative for cough, wheezing or SOB  Musculoskeletal: see HPI  Neurological: Negative for weakness or paresthesia      There were no vitals filed for this visit. Physical Examination:  General: Well developed, well nourished, in no acute distress  Skin: Warm and dry  Head: Normocephalic, atraumatic  Eyes: Sclera clear, EOMI  Neck: Normal range of motion  Respiratory: symmetrical, unlabored effort  Cardiovascular:  regular rate   Extremities: right 5th toe deformity, right lateral patellar TTP  Neurological: Normal strength and sensation. No focal deficits  Psychological: Active, alert and oriented. Affect appropriate     X-ray: viewed independently by myself,  right 5th toe fracture    Diagnoses and all orders for this visit:    1. Acute pain of right knee  -     XR KNEE RT 3 V; Future    2. Toe pain, right  -     XR 5TH TOE RT MIN 2 V; Future    3. Fall, initial encounter  -     XR 5TH TOE RT MIN 2 V; Future      Plan of care:  Rest the injured area as much as practical, apply ice packs, elevate the injured limb, compressive bandage  Diagnoses were discussed in detail with patient. Medication risks/benefits/side effects discussed with patient.    All of the patient's questions were addressed and answered to apparent satisfaction. The patient understands and agrees with our plan of care. The patient knows to call back if they have questions about the plan of care or if symptoms change. The patient received an After-Visit Summary which contains VS, diagnoses, orders, allergy and medication lists. No future appointments.

## 2020-09-10 VITALS
WEIGHT: 240.6 LBS | SYSTOLIC BLOOD PRESSURE: 138 MMHG | TEMPERATURE: 97.7 F | DIASTOLIC BLOOD PRESSURE: 86 MMHG | BODY MASS INDEX: 41.07 KG/M2 | OXYGEN SATURATION: 98 % | HEART RATE: 103 BPM | HEIGHT: 64 IN

## 2020-11-15 DIAGNOSIS — F43.22 ADJUSTMENT DISORDER WITH ANXIOUS MOOD: ICD-10-CM

## 2020-11-17 RX ORDER — SERTRALINE HYDROCHLORIDE 25 MG/1
TABLET, FILM COATED ORAL
Qty: 30 TAB | Refills: 3 | Status: SHIPPED | OUTPATIENT
Start: 2020-11-17 | End: 2021-03-23 | Stop reason: SDUPTHER

## 2021-03-23 DIAGNOSIS — F43.22 ADJUSTMENT DISORDER WITH ANXIOUS MOOD: ICD-10-CM

## 2021-03-23 RX ORDER — SERTRALINE HYDROCHLORIDE 25 MG/1
TABLET, FILM COATED ORAL
Qty: 30 TAB | Refills: 0 | Status: SHIPPED | OUTPATIENT
Start: 2021-03-23 | End: 2021-03-29 | Stop reason: SDUPTHER

## 2021-03-29 ENCOUNTER — VIRTUAL VISIT (OUTPATIENT)
Dept: FAMILY MEDICINE CLINIC | Age: 46
End: 2021-03-29
Payer: COMMERCIAL

## 2021-03-29 DIAGNOSIS — Z23 ENCOUNTER FOR IMMUNIZATION: ICD-10-CM

## 2021-03-29 DIAGNOSIS — K21.9 GASTROESOPHAGEAL REFLUX DISEASE, UNSPECIFIED WHETHER ESOPHAGITIS PRESENT: ICD-10-CM

## 2021-03-29 DIAGNOSIS — F43.22 ADJUSTMENT DISORDER WITH ANXIOUS MOOD: Primary | ICD-10-CM

## 2021-03-29 PROCEDURE — 99214 OFFICE O/P EST MOD 30 MIN: CPT | Performed by: FAMILY MEDICINE

## 2021-03-29 RX ORDER — SERTRALINE HYDROCHLORIDE 25 MG/1
TABLET, FILM COATED ORAL
Qty: 90 TAB | Refills: 1 | Status: SHIPPED | OUTPATIENT
Start: 2021-03-29 | End: 2021-10-07

## 2021-03-29 NOTE — PROGRESS NOTES
Jacquelyn Patel is a 48 y.o. female who was seen by synchronous (real-time) audio-video technology. Consent:  Patient and/or their healthcare decision maker is aware that this patient-initiated Telehealth encounter is a billable service, with coverage as determined by their insurance carrier. They are aware that they may receive a bill and have provided verbal consent to proceed: Yes    I was in the office while conducting this encounter. Platform: Doxy. me    HPI: Pt is a 48 y.o. female who presents for f/u depression. She has been taking 25mg of Zoloft for a long time and feels like it is working very well for her. Her sleep and appetite are doing well and at baseline and her mood is good. She would like to continue this dose. She also takes hydroxyzine prn for anxiety or itching. Her GERD is well controlled with protonix. She has tried to come off of this in the past and had return of symptoms. She recently saw her GYN at [facility] for a pap, and has a mammogram scheduled. Will request records. She has gotten both doses of COVID vaccine.      Past Medical History:   Diagnosis Date    GERD (gastroesophageal reflux disease)     Headache     Hearing loss     Palpitations     PVC's noted in past; workup by Caridology in past [place]       Family History   Problem Relation Age of Onset    Headache Father     Neuropathy Father     Diabetes Father     Neuropathy Sister     Diabetes Sister     Cancer Maternal Grandmother     Cancer Paternal Grandmother     Breast Cancer Paternal Grandmother     Cancer Paternal Grandfather     Headache Other     Breast Cancer Paternal Aunt     Breast Cancer Cousin        Social History     Tobacco Use    Smoking status: Current Some Day Smoker     Years: 19.00    Smokeless tobacco: Never Used    Tobacco comment: socially    Substance Use Topics    Alcohol use: Yes     Comment: Rarely     Drug use: No       ROS:  Per HPI    PE:  There were no vitals taken for this visit.  Gen: Pt in NAD  Head: Normocephalic, atraumatic  Eyes: Sclera anicteric, EOM grossly intact  Throat: MMM  Neck: Supple  Resp: Speaking easily in full sentences without respiratory distress  Neuro: Alert, oriented, appropriate      A/P:   Encounter Diagnoses     ICD-10-CM ICD-9-CM   1. Adjustment disorder with anxious mood  F43.22 309.24   2. Encounter for immunization  Z23 V03.89   3. Gastroesophageal reflux disease, unspecified whether esophagitis present  K21.9 530.81     1. Adjustment disorder with anxious mood: Stable, continue current dose f=of Zoloft and hydroxyzine prn  - sertraline (ZOLOFT) 25 mg tablet; TAKE 1 TABLET BY MOUTH DAILY  Dispense: 90 Tab; Refill: 1    2. Encounter for immunization: Will send in rx for TDaP and PPSV23 given smoking.   - pneumococcal 23-valent (PNEUMOVAX 23) 25 mcg/0.5 mL injection; 0.5 mL by IntraMUSCular route once for 1 dose. Dispense: 0.5 mL; Refill: 0  - diph,Pertuss,Acell,,Tet Vac-PF (ADACEL) 2 Lf-(2.5-5-3-5 mcg)-5Lf/0.5 mL susp; 0.5 mL by IntraMUSCular route once for 1 dose. Dispense: 1 Syringe; Refill: 0    3. Gastroesophageal reflux disease, unspecified whether esophagitis present: Stable on protonix    Will request records from [facility] for pap and labs       RTC in 1 year for f/u adjustment disorder/anxiety, or sooner prn    Discussed diagnoses in detail with patient. Medication risks/benefits/side effects discussed with patient. All of the patient's questions were addressed. The patient understands and agrees with our plan of care. The patient knows to call back if they are unsure of or forget any changes we discussed today or if the symptoms changeCelestino Navas is a 48 y.o. female being evaluated by a video visit encounter for concerns as above. A caregiver was present when appropriate.  Due to this being a TeleHealth encounter ([facility]), evaluation of the following organ systems was limited: Vitals/Constitutional/EENT/Resp/CV/GI//MS/Neuro/Skin/Heme-Lymph-Imm. Pursuant to the emergency declaration under the [address] [place] [address] waiver authority and the [employer] Act, this Virtual  Visit was conducted, with patient's (and/or legal guardian's) consent, to reduce the patient's risk of exposure to COVID-19 and provide necessary medical care. Services were provided through a video synchronous discussion virtually to substitute for in-person clinic visit. Patient and provider were located in their home and in the office, respectively. Current Outpatient Medications on File Prior to Visit   Medication Sig Dispense Refill    esomeprazole (NEXIUM) 40 mg capsule TAKE 1 CAPSULE DAILY 90 Cap 3    hydrOXYzine HCL (ATARAX) 25 mg tablet TAKE 1 TABLET 3 TIMES A DAYAS NEEDED FOR ITCHING 90 Tab 11     No current facility-administered medications on file prior to visit.

## 2021-03-30 PROBLEM — Z01.810 PREOP CARDIOVASCULAR EXAM: Status: RESOLVED | Noted: 2017-06-21 | Resolved: 2021-03-30

## 2021-03-30 PROBLEM — F43.22 ADJUSTMENT DISORDER WITH ANXIOUS MOOD: Status: ACTIVE | Noted: 2021-03-30

## 2021-03-30 PROBLEM — K21.9 GERD (GASTROESOPHAGEAL REFLUX DISEASE): Status: ACTIVE | Noted: 2021-03-30

## 2021-08-10 NOTE — MR AVS SNAPSHOT
Visit Information Date & Time Provider Department Dept. Phone Encounter #  
 11/20/2017  3:45 PM Bryant Ahmadi  [facility] [phone] [id number] Follow-up Instructions Return in about 1 year (around 11/20/2018) for [facility]. Upcoming Health Maintenance Date Due DTaP/Tdap/Td series (1 - Tdap) 2/10/1994 PAP AKA CERVICAL CYTOLOGY 2/10/1994 Allergies as of 11/20/2017  Review Complete On: 11/20/2017 By: Leora Allen LPN Severity Noted Reaction Type Reactions Bactrim [Sulfamethoprim Ds]  01/23/2017    Hives Shellfish Derived  06/19/2017    Hives, Itching Current Immunizations  Reviewed on 9/29/2016 Name Date Influenza Vaccine 10/3/2017, 9/29/2016 Not reviewed this visit You Were Diagnosed With   
  
 Codes Comments Class 3 obesity due to excess calories without serious comorbidity with body mass index (BMI) of 45.0 to 49.9 in adult [facility])    -  Primary ICD-10-CM: E66.09, Z68.42 
ICD-9-CM: 278.00, V85.42 Well woman exam     ICD-10-CM: E49.914 ICD-9-CM: V72.31 Vitals BP Pulse Temp Resp Height(growth percentile) Weight(growth percentile) 136/88 (BP 1 Location: Right arm, BP Patient Position: Sitting) (!) 104 98 °F (36.7 °C) (Oral) 16 5' 3.5\" (1.613 m) 260 lb (117.9 kg) SpO2 BMI OB Status Smoking Status 98% 45.33 kg/m2 Having regular periods Never Smoker Vitals History BMI and BSA Data Body Mass Index Body Surface Area  
 45.33 kg/m 2 2.3 m 2 Preferred Pharmacy Pharmacy Name Phone [address], [place] [address] [phone] Your Updated Medication List  
  
   
This list is accurate as of: 11/20/17  4:26 PM.  Always use your most recent med list.  
  
  
  
  
 hydrOXYzine HCl 25 mg tablet Commonly known as:  ATARAX Take  by mouth nightly. Takes 1-2 tab NexIUM 40 mg capsule Generic drug:  esomeprazole Take  by mouth daily. phentermine 37.5 mg capsule Take 37.5 mg by mouth every morning. Max Daily Amount: 37.5 mg.  
  
  
  
  
Prescriptions Printed Refills  
 phentermine 37.5 mg capsule 3 Sig: Take 37.5 mg by mouth every morning. Max Daily Amount: 37.5 mg.  
 Class: Print Route: Oral  
  
Follow-up Instructions Return in about 1 year (around 11/20/2018) for [facility]. Patient Instructions Starting a Weight Loss Plan: Care Instructions Your Care Instructions If you are thinking about losing weight, it can be hard to know where to start. Your doctor can help you set up a weight loss plan that best meets your needs. You may want to take a class on nutrition or exercise, or join a weight loss support group. If you have questions about how to make changes to your eating or exercise habits, ask your doctor about seeing a registered dietitian or an exercise specialist. 
It can be a big challenge to lose weight. But you do not have to make huge changes at once. Make small changes, and stick with them. When those changes become habit, add a few more changes. If you do not think you are ready to make changes right now, try to pick a date in the future. Make an appointment to see your doctor to discuss whether the time is right for you to start a plan. Follow-up care is a key part of your treatment and safety. Be sure to make and go to all appointments, and call your doctor if you are having problems. It's also a good idea to know your test results and keep a list of the medicines you take. How can you care for yourself at home? · Set realistic goals. Many people expect to lose much more weight than is likely. A weight loss of 5% to 10% of your body weight may be enough to improve your health. · Get family and friends involved to provide support. Talk to them about why you are trying to lose weight, and ask them to help.  They can help by participating in exercise and having meals with you, even if they may be eating something different. · Find what works best for you. If you do not have time or do not like to cook, a program that offers meal replacement bars or shakes may be better for you. Or if you like to prepare meals, finding a plan that includes daily menus and recipes may be best. 
· Ask your doctor about other health professionals who can help you achieve your weight loss goals. ¨ A dietitian can help you make healthy changes in your diet. ¨ An exercise specialist or  can help you develop a safe and effective exercise program. 
¨ A counselor or psychiatrist can help you cope with issues such as depression, anxiety, or family problems that can make it hard to focus on weight loss. · Consider joining a support group for people who are trying to lose weight. Your doctor can suggest groups in your area. Where can you learn more? Go to http://akira-carol.info/. Enter O084 in the search box to learn more about \"Starting a Weight Loss Plan: Care Instructions. \" Current as of: October 13, 2016 Content Version: 11.4 © 0644-1906 COM DEV. Care instructions adapted under license by [employer] (which disclaims liability or warranty for this information). If you have questions about a medical condition or this instruction, always ask your healthcare professional. [address] any warranty or liability for your use of this information. Introducing [facility]! Dear Simone Murphy: 
Thank you for requesting a Voice Assist account. Our records indicate that you already have an active Voice Assist account. You can access your account anytime at [email or web address] Did you know that you can access your hospital and ER discharge instructions at any time in Voice Assist? You can also review all of your test results from your hospital stay or ER visit. Additional Information If you have questions, please visit the Frequently Asked Questions section of the [employer] website at [email or web address] Remember, [employer] is NOT to be used for urgent needs. For medical emergencies, dial 911. Now available from your iPhone and Android! Please provide this summary of care documentation to your next provider. Your primary care clinician is listed as Greg Corona. If you have any questions after today's visit, please call [phone]. No

## 2021-08-25 ENCOUNTER — OFFICE VISIT (OUTPATIENT)
Dept: PODIATRY | Age: 46
End: 2021-08-25
Payer: COMMERCIAL

## 2021-08-25 VITALS
DIASTOLIC BLOOD PRESSURE: 88 MMHG | HEART RATE: 70 BPM | BODY MASS INDEX: 44.65 KG/M2 | WEIGHT: 252 LBS | OXYGEN SATURATION: 100 % | HEIGHT: 63 IN | SYSTOLIC BLOOD PRESSURE: 132 MMHG | TEMPERATURE: 97.7 F

## 2021-08-25 DIAGNOSIS — L60.0 INGROWN RIGHT GREATER TOENAIL: Primary | ICD-10-CM

## 2021-08-25 PROCEDURE — 99213 OFFICE O/P EST LOW 20 MIN: CPT | Performed by: PODIATRIST

## 2021-08-25 NOTE — PROGRESS NOTES
Chief Complaint   Patient presents with    Ingrown Toenail     R great toe     1. Have you been to the ER, urgent care clinic since your last visit? Hospitalized since your last visit? No    2. Have you seen or consulted any other health care providers outside of the [address] [place] [address] since your last visit? Include any pap smears or colon screening.  No  PCP-Dr Mendoza

## 2021-09-09 NOTE — PROGRESS NOTES
[facility]    Subjective:         Patient is a 48 y.o. female who is being seen as a new pt for right great toe pain. Patient states she has been suffering from this issue for the past few days. She denies any associated trauma. She states the pain rises to level of 3 out of 10 and is located to the medial nail border of the right great toe. She is the pain is exacerbated with close toed shoes and weightbearing. She denies any changes in her activity or shoe gear. She denies any overt local/systemic signs of infection. She denies any other pedal complaints    Past Medical History:   Diagnosis Date    GERD (gastroesophageal reflux disease)     Headache     Hearing loss     Palpitations     PVC's noted in past; workup by Caridology in past [place]     Past Surgical History:   Procedure Laterality Date    HX CHOLECYSTECTOMY      HX DILATION AND CURETTAGE      HX HEENT  1978    adenoids    HX TONSILLECTOMY  1979    NEUROLOGICAL PROCEDURE UNLISTED      subdural hematoma-age 2       Family History   Problem Relation Age of Onset    Headache Father     Neuropathy Father     Diabetes Father     Neuropathy Sister     Diabetes Sister     Cancer Maternal Grandmother     Cancer Paternal Grandmother     Breast Cancer Paternal Grandmother     Cancer Paternal Grandfather     Headache Other     Breast Cancer Paternal Aunt     Breast Cancer Cousin       Social History     Tobacco Use    Smoking status: Current Some Day Smoker     Years: 19.00    Smokeless tobacco: Never Used    Tobacco comment: socially    Substance Use Topics    Alcohol use: Yes     Comment: Rarely      Allergies   Allergen Reactions    Bactrim [Sulfamethoprim Ds] Hives    Shellfish Derived Hives and Itching    Sulfa (Sulfonamide Antibiotics) Hives     Prior to Admission medications    Medication Sig Start Date End Date Taking?  Authorizing Provider   esomeprazole (NEXIUM) 40 mg capsule TAKE 1 CAPSULE DAILY 6/23/21   Sonam Valencia MD   hydrOXYzine HCL (ATARAX) 25 mg tablet Take 1 Tab by mouth three (3) times daily as needed for Itching, Anxiety or Sleep. 4/30/21   Romero Fitch MD   sertraline (ZOLOFT) 25 mg tablet TAKE 1 TABLET BY MOUTH DAILY 3/29/21   Romero Fitch MD       Review of Systems   Constitutional: Negative. HENT: Negative. Eyes: Negative. Respiratory: Negative. Cardiovascular: Negative. Gastrointestinal: Negative. Endocrine: Negative. Genitourinary: Negative. Musculoskeletal: Negative. Skin: Negative. Allergic/Immunologic: Negative. Neurological: Negative. Hematological: Negative. Psychiatric/Behavioral: The patient is nervous/anxious. All other systems reviewed and are negative. Objective:     Visit Vitals  /88 (BP 1 Location: Right upper arm, BP Patient Position: Sitting, BP Cuff Size: Large adult)   Pulse 70   Temp 97.7 °F (36.5 °C) (Temporal)   Ht 5' 3\" (1.6 m)   Wt 252 lb (114.3 kg)   SpO2 100%   BMI 44.64 kg/m²       Physical Exam  Vitals reviewed. Constitutional:       Appearance: She is morbidly obese. Cardiovascular:      Pulses:           Dorsalis pedis pulses are 2+ on the right side and 2+ on the left side. Posterior tibial pulses are 2+ on the right side and 2+ on the left side. Pulmonary:      Effort: Pulmonary effort is normal.   Musculoskeletal:      Right lower leg: No edema. Left lower leg: No edema. Right foot: Normal range of motion. No deformity or bunion. Left foot: Normal range of motion. No deformity or bunion. Feet:      Right foot:      Protective Sensation: 10 sites tested. 10 sites sensed. Skin integrity: Erythema present. Toenail Condition: Right toenails are ingrown. Left foot:      Protective Sensation: 10 sites tested. 10 sites sensed.       Skin integrity: Skin integrity normal.      Toenail Condition: Left toenails are normal.   Lymphadenopathy:      Lower Body: No right inguinal adenopathy. No left inguinal adenopathy. Skin:     General: Skin is warm. Capillary Refill: Capillary refill takes 2 to 3 seconds. Neurological:      Mental Status: She is alert and oriented to person, place, and time. Psychiatric:         Mood and Affect: Mood and affect normal.         Behavior: Behavior is cooperative. Data Review: No results found for this or any previous visit (from the past 24 hour(s)). Impression:       ICD-10-CM ICD-9-CM    1. Ingrown right greater toenail  L60.0 703.0        Recommendation:     Patient seen and evaluated in the office  Discussed and educated patient regarding her current medical condition  A slant back procedure was performed to the offending nail border of the right hallux. Patient tolerated well no dressings needed. Instructed patient if her symptoms persist, she may need a more permanent procedure. Patient verbalized understanding        Geo Barker.  Adriel King, [facility], [address] [place] [address] and [facility]  [address] [place] [address]  O: [phone]  F: [phone]  C: [phone]

## 2021-10-06 DIAGNOSIS — F43.22 ADJUSTMENT DISORDER WITH ANXIOUS MOOD: ICD-10-CM

## 2021-10-07 RX ORDER — SERTRALINE HYDROCHLORIDE 25 MG/1
TABLET, FILM COATED ORAL
Qty: 90 TABLET | Refills: 1 | Status: SHIPPED | OUTPATIENT
Start: 2021-10-07 | End: 2022-03-30 | Stop reason: SDUPTHER

## 2021-12-23 DIAGNOSIS — K22.10 EROSIVE ESOPHAGITIS: ICD-10-CM

## 2021-12-24 RX ORDER — ESOMEPRAZOLE MAGNESIUM 40 MG/1
CAPSULE, DELAYED RELEASE ORAL
Qty: 90 CAPSULE | Refills: 1 | Status: SHIPPED | OUTPATIENT
Start: 2021-12-24 | End: 2022-06-14

## 2022-01-04 ENCOUNTER — OFFICE VISIT (OUTPATIENT)
Dept: FAMILY MEDICINE CLINIC | Age: 47
End: 2022-01-04
Payer: COMMERCIAL

## 2022-01-04 VITALS
WEIGHT: 257.4 LBS | BODY MASS INDEX: 45.61 KG/M2 | RESPIRATION RATE: 16 BRPM | HEIGHT: 63 IN | HEART RATE: 99 BPM | DIASTOLIC BLOOD PRESSURE: 83 MMHG | OXYGEN SATURATION: 100 % | SYSTOLIC BLOOD PRESSURE: 126 MMHG | TEMPERATURE: 98.1 F

## 2022-01-04 DIAGNOSIS — J06.9 UPPER RESPIRATORY TRACT INFECTION, UNSPECIFIED TYPE: Primary | ICD-10-CM

## 2022-01-04 LAB
QUICKVUE INFLUENZA TEST: NEGATIVE
S PYO AG THROAT QL: NEGATIVE
VALID INTERNAL CONTROL?: YES
VALID INTERNAL CONTROL?: YES

## 2022-01-04 PROCEDURE — 87804 INFLUENZA ASSAY W/OPTIC: CPT | Performed by: FAMILY MEDICINE

## 2022-01-04 PROCEDURE — 87880 STREP A ASSAY W/OPTIC: CPT | Performed by: FAMILY MEDICINE

## 2022-01-04 PROCEDURE — 99214 OFFICE O/P EST MOD 30 MIN: CPT | Performed by: FAMILY MEDICINE

## 2022-01-04 RX ORDER — PREDNISONE 20 MG/1
20 TABLET ORAL 2 TIMES DAILY
Qty: 10 TABLET | Refills: 0 | Status: SHIPPED | OUTPATIENT
Start: 2022-01-04 | End: 2022-01-09

## 2022-01-04 RX ORDER — AZITHROMYCIN 250 MG/1
TABLET, FILM COATED ORAL
Qty: 6 TABLET | Refills: 0 | Status: SHIPPED | OUTPATIENT
Start: 2022-01-04 | End: 2022-01-09

## 2022-01-04 NOTE — PROGRESS NOTES
1. Have you been to the ER, urgent care clinic since your last visit? Hospitalized since your last visit? No    2. Have you seen or consulted any other health care providers outside of the [address] [place] [address] since your last visit? Include any pap smears or colon screening. No    Reviewed record in preparation for visit and have necessary documentation  Pt did not bring medication to office visit for review  Patient is accompanied by self I have received verbal consent from Kamaljit Bautista to discuss any/all medical information while they are present in the room.     Goals that were addressed and/or need to be completed during or after this appointment include     Health Maintenance Due   Topic Date Due    Hepatitis C Screening  Never done    Pneumococcal 0-64 years (1 of 2 - PPSV23) Never done    Cervical cancer screen  Never done    DTaP/Tdap/Td series (2 - Tdap) 08/16/2017    Colorectal Cancer Screening Combo  Never done    COVID-19 Vaccine (3 - Booster for Moderna series) 08/17/2021    Flu Vaccine (1) 09/01/2021

## 2022-01-06 LAB
SARS-COV-2, NAA 2 DAY TAT: NORMAL
SARS-COV-2, NAA: NOT DETECTED

## 2022-01-07 NOTE — PROGRESS NOTES
Patient: Rebecca Sykes MRN: [id number]  SSN: [id number]    YOB: 1975  Age: 48 y.o. Sex: female      Chief Complaint   Patient presents with    Cold Symptoms     sore throat, cough, congestion since thursday      Rebecca Sykes is a 48 y.o. female presents with complaints of congestion, sore throat and dry cough for 5 days. There has been no nausea and no vomiting . she has not had  headache and fever. Symptoms are moderate. Patient is drinking plenty of fluids. There is not a hx of asthma. There is not a hx of allergic rhinitis. There is a hx of tobacco use. Medications:     Current Outpatient Medications   Medication Sig    guaifenesin/phenylephrine HCl (MUCINEX COLD PO) Take  by mouth.  DM/p-ephed/acetaminoph/doxylam (NYQUIL PO) Take  by mouth.  predniSONE (DELTASONE) 20 mg tablet Take 20 mg by mouth two (2) times a day for 5 days.  azithromycin (ZITHROMAX) 250 mg tablet Take 2 tablets today, then take 1 tablet daily    esomeprazole (NEXIUM) 40 mg capsule TAKE 1 CAPSULE DAILY    sertraline (ZOLOFT) 25 mg tablet TAKE 1 TABLET DAILY    hydrOXYzine HCL (ATARAX) 25 mg tablet Take 1 Tab by mouth three (3) times daily as needed for Itching, Anxiety or Sleep. No current facility-administered medications for this visit. Problem List:     Patient Active Problem List    Diagnosis Date Noted    GERD (gastroesophageal reflux disease) 03/30/2021    Adjustment disorder with anxious mood 03/30/2021    Severe obesity (BMI 35.0-39.9) 09/06/2018    Abnormal EKG 06/21/2017       Medical History:     Past Medical History:   Diagnosis Date    GERD (gastroesophageal reflux disease)     Headache     Hearing loss     Palpitations     PVC's noted in past; workup by Caridology in past [place]       Allergies:      Allergies   Allergen Reactions    Bactrim [Sulfamethoprim Ds] Hives    Shellfish Derived Hives and Itching    Sulfa (Sulfonamide Antibiotics) Hives       Surgical History:     Past Surgical History:   Procedure Laterality Date    HX CHOLECYSTECTOMY      HX DILATION AND CURETTAGE      HX HEENT  1978    adenoids    HX TONSILLECTOMY  1979    NEUROLOGICAL PROCEDURE UNLISTED      subdural hematoma-age 2       Social History:     Social History     Socioeconomic History    Marital status:    Tobacco Use    Smoking status: Current Some Day Smoker     Years: 19.00    Smokeless tobacco: Never Used    Tobacco comment: socially    Substance and Sexual Activity    Alcohol use: Yes     Comment: Rarely     Drug use: No    Sexual activity: Yes     Partners: Male     Birth control/protection: None     Comment:         Review of Symptoms:  Constitutional: c/o malaise, denies fever or chills  Skin: Negative for rash or lesion  Head: Negative for facial swelling or tenderness  Eyes: Negative for redness or discharge  Ears: Negative for otalgia or decreased hearing  Nose: c/o nasal congestion, denies sinus pressure  Neck: c/o sore throat, denies lymphadenopathy   Cardiovascular: Negative for chest pain or palpitations  Respiratory: c/o non-productive cough, denies wheezing or SOB  Gastrointestinal: Negative for nausea or abdominal pain  Neurologic: Negative for headache or dizziness      Visit Vitals  /83 (BP 1 Location: Right upper arm, BP Patient Position: Sitting, BP Cuff Size: Large adult)   Pulse 99   Temp 98.1 °F (36.7 °C) (Oral)   Resp 16   Ht 5' 3\" (1.6 m)   Wt 257 lb 6.4 oz (116.8 kg)   SpO2 100%   BMI 45.60 kg/m²       Physical Examination:  General: Well developed, well nourished, in no acute distress  Skin: Warm and dry sans rash or lesion  Head: Normocephalic, atraumatic  Eyes: Sclera clear, EOMI, PERRL  Ears: tympanic membranes normal in appearance  Nose: mucosal edema with rhinorrhea  Oropharynx: posterior erythema, no exudate   Neck: Normal range of motion, no lymphadenopathy  Cardiovascular: normal S1, S2, regular rate and rhythm  Respiratory: Clear to auscultation bilaterally with symmetrical, unlabored effort  Extremities: Full range of motion  Neurologic: Active, alert and oriented      Diagnoses and all orders for this visit:    1. Upper respiratory tract infection, unspecified type  -     AMB POC RAPID STREP A  -     AMB POC RAPID INFLUENZA TEST  -     predniSONE (DELTASONE) 20 mg tablet; Take 20 mg by mouth two (2) times a day for 5 days. -     azithromycin (ZITHROMAX) 250 mg tablet; Take 2 tablets today, then take 1 tablet daily  -     NOVEL CORONAVIRUS (COVID-19)    Other orders  -     SARS-COV-2, ALEK 2 DAY TAT        Plan of Care:  Symptomatic therapy suggested: rest, increase fluids and call prn if symptoms persist or worsen. Diagnoses were discussed in detail with patient. Medication risks/benefits/side effects discussed with patient. All of the patient's questions were addressed and answered to apparent satisfaction. The patient understands and agrees with our plan of care. The patient knows to call back if they have questions about the plan of care or if symptoms change. The patient received an After-Visit Summary which contains VS, diagnoses, orders, allergy and medication lists. No future appointments.

## 2022-01-11 ENCOUNTER — TELEPHONE (OUTPATIENT)
Dept: FAMILY MEDICINE CLINIC | Age: 47
End: 2022-01-11

## 2022-01-11 DIAGNOSIS — J06.9 UPPER RESPIRATORY TRACT INFECTION, UNSPECIFIED TYPE: Primary | ICD-10-CM

## 2022-01-11 RX ORDER — AMOXICILLIN AND CLAVULANATE POTASSIUM 875; 125 MG/1; MG/1
1 TABLET, FILM COATED ORAL 2 TIMES DAILY
Qty: 20 TABLET | Refills: 0 | Status: SHIPPED | OUTPATIENT
Start: 2022-01-11 | End: 2022-01-21

## 2022-01-11 RX ORDER — FLUCONAZOLE 150 MG/1
150 TABLET ORAL DAILY
Qty: 1 TABLET | Refills: 0 | Status: SHIPPED | OUTPATIENT
Start: 2022-01-11 | End: 2022-01-12

## 2022-01-11 NOTE — TELEPHONE ENCOUNTER
Pt states she's still sick and today is the 12th day. She wasn't sure if Dr Alma Rosa Alonzo want to do a telephone visit or maybe he could give her a call.     Pt telephone number is [phone]

## 2022-01-11 NOTE — TELEPHONE ENCOUNTER
Called patient. She was advised per Dr Almanza:\"Prescription e-scribed to pharmacy. CXR ordered. \" Verbalized understanding.

## 2022-01-11 NOTE — TELEPHONE ENCOUNTER
Even though the Covid-19 test was negative. Her symptoms and the length of them indicate that she does have Covid-19. Flu and strep were negative and there has been no improvement with azithromycin. We can do a CXR. Did the steroid help her symptoms?

## 2022-01-11 NOTE — TELEPHONE ENCOUNTER
Called patient. She was advised per Dr Alfonzo Yan: \"Even though the Covid-19 test was negative. Symptoms and the length of them indicate that she does have Covid-19. Flu and strept were negative and there has been no improvement with azithromycin. We can do a CXR. Did the steroid help her symptoms? \" Verbalized understanding. She stated on Thursday, 1/6/22 she felt good but on Friday she had a terrible sore throat. She doesn't feel Prednisone has helped because she is having chest congestion now and would like to have a CXR. She is not running a fever and her sats are good. Patient stated that both of her eyes are now blood shot red and having drainage. Patient stated that she agreed to take the Azithromycin but then chickened out, She is asking if Dr Alfonzo Yan can change the antibiotic and if so, can he also order Diflucan? Patient stated she did an at home Covid test last night and it was also negative.

## 2022-01-12 ENCOUNTER — TELEPHONE (OUTPATIENT)
Dept: FAMILY MEDICINE CLINIC | Age: 47
End: 2022-01-12

## 2022-03-19 PROBLEM — K21.9 GERD (GASTROESOPHAGEAL REFLUX DISEASE): Status: ACTIVE | Noted: 2021-03-30

## 2022-03-19 PROBLEM — F43.22 ADJUSTMENT DISORDER WITH ANXIOUS MOOD: Status: ACTIVE | Noted: 2021-03-30

## 2022-03-19 PROBLEM — R94.31 ABNORMAL EKG: Status: ACTIVE | Noted: 2017-06-21

## 2022-03-30 DIAGNOSIS — F43.22 ADJUSTMENT DISORDER WITH ANXIOUS MOOD: ICD-10-CM

## 2022-03-31 RX ORDER — SERTRALINE HYDROCHLORIDE 25 MG/1
TABLET, FILM COATED ORAL
Qty: 90 TABLET | Refills: 1 | Status: SHIPPED | OUTPATIENT
Start: 2022-03-31 | End: 2022-05-03

## 2022-04-04 PROBLEM — E66.01 SEVERE OBESITY WITH BODY MASS INDEX (BMI) OF 35.0 TO 39.9 WITH SERIOUS COMORBIDITY (HCC): Status: ACTIVE | Noted: 2018-09-06

## 2022-04-05 ENCOUNTER — OFFICE VISIT (OUTPATIENT)
Dept: FAMILY MEDICINE CLINIC | Age: 47
End: 2022-04-05
Payer: COMMERCIAL

## 2022-04-05 VITALS
WEIGHT: 259 LBS | OXYGEN SATURATION: 99 % | SYSTOLIC BLOOD PRESSURE: 122 MMHG | HEIGHT: 63 IN | HEART RATE: 97 BPM | RESPIRATION RATE: 16 BRPM | BODY MASS INDEX: 45.89 KG/M2 | DIASTOLIC BLOOD PRESSURE: 70 MMHG | TEMPERATURE: 97.4 F

## 2022-04-05 DIAGNOSIS — Z12.11 SCREEN FOR COLON CANCER: ICD-10-CM

## 2022-04-05 DIAGNOSIS — E66.01 CLASS 3 SEVERE OBESITY DUE TO EXCESS CALORIES WITH SERIOUS COMORBIDITY AND BODY MASS INDEX (BMI) OF 45.0 TO 49.9 IN ADULT (HCC): Primary | ICD-10-CM

## 2022-04-05 PROCEDURE — 99214 OFFICE O/P EST MOD 30 MIN: CPT | Performed by: STUDENT IN AN ORGANIZED HEALTH CARE EDUCATION/TRAINING PROGRAM

## 2022-04-05 RX ORDER — PHENTERMINE HYDROCHLORIDE 37.5 MG/1
37.5 TABLET ORAL
Qty: 30 TABLET | Refills: 0 | Status: SHIPPED | OUTPATIENT
Start: 2022-04-05 | End: 2022-05-03 | Stop reason: SDUPTHER

## 2022-04-05 RX ORDER — PHENTERMINE HYDROCHLORIDE 37.5 MG/1
37.5 TABLET ORAL
Qty: 30 TABLET | Refills: 5 | Status: SHIPPED | OUTPATIENT
Start: 2022-04-05 | End: 2022-04-05

## 2022-04-05 NOTE — LETTER
Name:Fredo Mejía  :1975   MR #:[id number]   [facility]   Page 1 of 5        CONTROLLED SUBSTANCE AGREEMENT     I may be prescribed medications that are controlled substances as part  of my treatment plan for management of my medical condition(s). The goal of my treatment plan is to maintain and/or improve my health and wellbeing. Because controlled substances have an increased risk of abuse or harm, continual re-evaluation is needed determine if the goals of my treatment plan are being met for my safety and the safety of others. Lynette Martines  am entering into this Controlled Substance Agreement with my provider, __________________________________ at [facility] . I understand that successful treatment requires mutual trust and honesty between me and my provider. I understand that there are state and federal laws and regulations which apply to the medications that my provider may prescribe that must be followed. I understand there are risks and benefits ts of taking the medicines that my provider may prescribe. I understand and agree that following this Agreement is necessary in continuing my provider-patient relationship and success of my treatment plan. As a part of my treatment plan, I agree to the following:    COMMUNICATION:    1. I will communicate fully with my provider about my medical condition(s), including the effect on my daily life and how well my medications are helping. I will tell my provider all of the medications that I take for any reason, including medications I receive from another health care provider, and will notify my provider about all issues, problems or concerns, including any side effects, which may be related to my medications. I understand that this information allows my provider to adjust my treatment plan to help manage my medical condition.  I understand that this information will become part of my permanent medical record. 2. I will notify my provider if I have a history of alcohol/drug misuse/addiction or if I have had treatment for alcohol/drug addiction in the past, or if I have a new problem with or concern about alcohol/drug use/addiction, because this increases the likelihood of high risk behaviors and may lead to serious medical conditions. 3. Females Only: I will notify my provider if I am or become pregnant, or if I intend to become pregnant, or if I intend to breastfeed. I understand that communication of these issues with my provider is important, due to possible effects my medication could have on an unborn fetus or breastfeeding child. Initials_____      Name:.Malathi Park   :1975   MR #:[id number]   Office:[address]   Page 2 of 5       MISUSE OF MEDICATIONS / DRUGS:    1. I agree to take all controlled substances as prescribed, and will not misuse or abuse any controlled substances prescribed by my provider. For my safety, I will not increase the amount of medicine I take without first talking with and getting permission from my provider. 2. If I have a medical emergency, another health care provider may prescribe me medication. If I seek emergency treatment, I will notify my provider within seventy-two (72) hours. 3. I understand that my provider may discuss my use and/or possible misuse/abuse of controlled substances and alcohol, as appropriate, with any health care provider involved in my care, pharmacist or legal authority. ILLEGAL DRUGS:    1. I will not use illegal drugs of any kind, including but not limited to marijuana, heroin, cocaine, or any prescription drug which is not prescribed to me. DRUG DIVERSION / PRESCRIPTION FRAUD:    1. I will not share, sell, trade, give away, or otherwise misuse my prescriptions or medications.     2. I will not alter any prescriptions provided to me by my provider. SINGLE PROVIDER:    1. I agree that all controlled substances that I take will be prescribed only by my provider (or his/her covering provider) under this Agreement. This agreement does not prevent me from seeking emergency medical treatment or receiving pain management related to a surgery. PROTECTING MEDICATIONS:    1. I am responsible for keeping my prescriptions and medications in a safe and secure place including safeguarding them from loss or theft. I understand that lost, stolen or damaged/destroyed prescriptions or medications will not be replaced. Initials____          Name:.Malathi Alvarez   :1975   MR #:[id number]   [address] [place] [address]   Page 3 of 5   PRESCRIPTION RENEWALS/REFILLS:    1. I will follow my controlled substance medication schedule as prescribed by my provider. 2. I understand and agree that I will make any requests for renewals or refills of my prescriptions only at the time of an office visit or during my providers regular office hours subject to the prescription refill requirements of the individual practice. 3. I understand that my provider may not call in prescriptions for controlled substances to my pharmacy. 4. I understand that my provider may adjust or discontinue these medications as deemed appropriate for my medical treatment plan. This Agreement does not guarantee the prescription of controlled medications. 5. I agree that if my medications are adjusted or discontinued, I will properly dispose of any remaining medications. I understand that I will be required to dispose of any remaining controlled medications prior to being provided with any prescriptions for other controlled medications. 6. I understand that the renewal of my prescription depends on my medical condition, my consistent participation, and my adherence with my treatment plan and this Agreement.     7. I understand that if I do not keep an appointment with my provider, I may not receive a renewal or refill for my controlled substance medication. PRESCRIPTION MONITORING / DRUG TESTIN. I understand that my provider may require me to provide urine, saliva or blood for testing at any time. I understand that this testing will be used to monitor for safety and adherence with my treatment plan and this Agreement. 2. I understand that my provider may ask me to provide an observed urine specimen, which means that a nurse or other health care provider may watch me provide urine, and I agree to cooperate if I am asked to provide an observed specimen. 3. I understand that if I do not provide urine, saliva or blood samples within two (2) hours of my providers request, or other timeframe decided by my provider, my treatment plan could be changed, or my prescriptions and medications may be changed or ended. 4. I understand that urine, saliva and blood test results will be a part of my permanent medical record. Initials_____        Name:Fredo Gray   :1975   MR #:[id number]   [address] [place] [address]   Page 4 of 5    5. I understand that my provider is required to obtain a copy of my State Prescription Monitoring Program () Report at any time in order to safely prescribe medications. 6. I will bring all of my prescribed controlled substance medications in their original bottles to all of my scheduled appointments. 7. I understand that my provider may ask me to come to the practice with all of my prescribed medications for a random pill count at any time. I agree to cooperate if I am asked to come in for a random pill count. I understand that if I do not arrive in the timeframe decided by my provider, my treatment plan could be changed, or my prescriptions and medications may be changed or ended.     COOPERATION WITH INVESTIGATIONS:    1. I authorize my provider and my pharmacy to cooperate fully with any local, state, or federal law enforcement agency in the investigation of any possible misuse, sale, or other diversion of my controlled substance prescriptions or medications. RISKS:    1. I understand that my level of consciousness may be affected from the use of controlled substances, and I understand that there are risks, benefits, effects and potential alternatives (including no treatment) to the medications that my provider has prescribed. 2. I understand that I may become drowsy, tired, dizzy, constipated, and sick to my stomach, or have changes in my mood or in my sleep while taking my medications. I have talked with my provider about these possible side effects, risks, benefits, and alternative treatments, and my provider has answered all of my questions. 3. I understand that I should not suddenly stop taking my medications without first speaking with my provider. I understand that if I suddenly stop taking my medications, I may experience nausea, vomiting, sweating,anxiety, sleeplessness, itching or other uncomfortable feelings. 4. I will not take my medications with alcohol of any kind, including beer, wine or liquor. I understand that drinking alcohol with my medications increases the chances of side effects, including breathing problems or even death. 5. I understand that if I have a history of alcoholism or other drug addiction I may be at increased risk of addiction to my medications. Signs of addiction might include craving, compulsive use, and continued use despite harm. Since addiction is a disease, I understand my provider may decide to change my medications and refer me to appropriate treatment services. I understand that this information would become part of my permanent medical record. Initials_____        Name:Fredo Shell   :1975   MR #:[id number]   [address] [place] [address]   Page 5 of 5       6.  Females only: Children born to women who regularly take controlled substances are likely to have physical problems and suffer withdrawal symptoms at birth. If I am of child-bearing age, I understand that I should use safe and effective birth control while taking any controlled substances to avoid the impact of medications on an unborn fetus or  child. I agree to notify my provider immediately if I should become pregnant so that my treatment plan can be adjusted. 7. Males only: I understand that chronic use of controlled substances has been associated with low testosterone levels in males which may affect my mood, stamina, sexual desire, and general health. I understand that my provider may order the appropriate laboratory test to determine my testosterone level,and I agree to this testing. ADHERENCE:    1. I understand that if I do not adhere to this Agreement in any way, my provider may change my prescriptions, stop prescribing controlled substances or end our provider-patient relationship. 2. If my provider decides to stop prescribing medication, or decides to end our provider-patient relationship,my provider may require that I taper my medications slowly. If necessary, my provider may also provide a prescription for other medications to treat my withdrawal symptoms. UNDERSTANDING THIS AGREEMENT:    I understand that my provider may adjust or stop my prescriptions for controlled substances based on my medical condition and my treatment plan. I understand that this Agreement does not guarantee that I will be prescribed medications or controlled substances. I understand that controlled substances may be just one part  of my treatment plan. My initial on each page and my signature below shows that I have read each page of this Agreement, I have had an opportunity to ask questions, and all of my questions have been answered to my satisfaction by my provider.     By signing below, I agree to comply with this Agreement, and I understand that if I do not follow the Agreements listed above, my provider may stop    _________________________________________  Date/Time 4/5/2022 8:48 AM                 (Patient Signature)    ________________________________________    Date/Time 4/5/2022 8:48 AM   (Parent or Guardian Signature if <18 yrs)    _________________________________________ Date/Time 4/5/2022 8:48 AM   (Provider Signature)

## 2022-04-05 NOTE — Clinical Note
4/5/2022 8:47 AM    Ms.  Scottmay Mireles  [address] [address]              Sincerely,      Marion Hanna MD

## 2022-04-05 NOTE — PATIENT INSTRUCTIONS
Phentermine (By mouth)   Phentermine (FEN-ter-meen)  Helps you lose weight when used for a short time. Brand Name(s): Adipex-P, Lomaira   There may be other brand names for this medicine. When This Medicine Should Not Be Used: This medicine is not right for everyone. Do not use it if you had an allergic reaction to phentermine or similar medicines, or if you are pregnant. How to Use This Medicine:   Dissolving Tablet, Capsule, Long Acting Capsule, Tablet, Dissolving Tablet  · Your doctor will tell you how much medicine to use. Do not use more than directed. · This medicine is not for long-term use. · To avoid trouble sleeping, always take this medicine in the morning and never at bedtime or late in the evening. ¨ Take the capsule 2 hours after breakfast.  ¨ Take the extended-release capsule before breakfast.  ¨ Take the disintegrating tablet in the morning, with or without food. ¨ Take the phentermine tablet before breakfast or 1 to 2 hours after breakfast.  ¨ Take Lomaira tablet 30 minutes before meals. · Swallow the extended-release capsule whole. Do not crush, break, or chew it. · If you are using the disintegrating tablet, make sure your hands are dry before you handle the tablet. Place the tablet on your tongue. It should melt quickly. After the tablet has melted, swallow or take a drink of water. · Tablet: Swallow whole. Do not crush, break, or chew it. · Carefully follow your doctor's instructions about any special diet. · Missed dose: Take a dose as soon as you remember. If it is almost time for your next dose, wait until then and take a regular dose. Do not take extra medicine to make up for a missed dose. · Store the medicine in a closed container at room temperature, away from heat, moisture, and direct light. Drugs and Foods to Avoid:   Ask your doctor or pharmacist before using any other medicine, including over-the-counter medicines, vitamins, and herbal products.   · Do not use this medicine and an MAO inhibitor (MAOI) within 14 days of each other. · Some medicines can affect how phentermine works. Tell your doctor if you are using any of the following:  ¨ Amphetamine medicine (including dextroamphetamine, methamphetamine)  ¨ Diet pills  ¨ Insulin or diabetes medicine  ¨ Medicine to treat depression (including fluoxetine, fluvoxamine, paroxetine, sertraline)  · Do not drink alcohol while you are using this medicine. Warnings While Using This Medicine:   · It is not safe to take this medicine during pregnancy. It could harm an unborn baby. Tell your doctor right away if you become pregnant. · Tell your doctor if you are breastfeeding, or if you have kidney disease, diabetes, glaucoma, congestive heart failure, heart or blood vessel disease, high blood pressure, overactive thyroid, or a history of stroke or drug abuse. Tell your doctor if have allergies to aspirin or tartrazine. · This medicine may cause the following problems:  ¨ Primary pulmonary hypertension (a serious lung problem)  ¨ Heart valve disease  ¨ Changes in blood sugar levels  · This medicine may make you dizzy or drowsy. Do not drive or do anything else that could be dangerous until you know how this medicine affects you. · This medicine can be habit-forming. Do not use more than your prescribed dose. Call your doctor if you think your medicine is not working. · Do not stop using this medicine suddenly. Your doctor will need to slowly decrease your dose before you stop it completely. · Your doctor will check your progress and the effects of this medicine at regular visits. Keep all appointments. · Keep all medicine out of the reach of children. Never share your medicine with anyone.   Possible Side Effects While Using This Medicine:   Call your doctor right away if you notice any of these side effects:  · Allergic reaction: Itching or hives, swelling in your face or hands, swelling or tingling in your mouth or throat, chest tightness, trouble breathing  · Chest pain, fainting, trouble breathing  · Fast, slow, pounding, or uneven heartbeat  · Seizures or tremors  · Severe headache  · Swelling of your feet or lower legs  If you notice these less serious side effects, talk with your doctor:   · Changes in sex drive  · Dizziness, drowsiness, mild headache  · Dry mouth or a bad taste in your mouth  · Nausea, vomiting, diarrhea, constipation, stomach cramps  · Restlessness or nervousness, trouble sleeping  If you notice other side effects that you think are caused by this medicine, tell your doctor. Call your doctor for medical advice about side effects. You may report side effects to FDA at 5-095-FDA-8822  © 2017 [facility] Information is for End User's use only and may not be sold, redistributed or otherwise used for commercial purposes. The above information is an  only. It is not intended as medical advice for individual conditions or treatments. Talk to your doctor, nurse or pharmacist before following any medical regimen to see if it is safe and effective for you.

## 2022-04-05 NOTE — PROGRESS NOTES
[facility]  Initial Visit      Date: 04/05/22    CC: Pt is a 50 y.o. F who presents for weight loss counseling. This is patients initial weight loss clinic visit. BMI today: Body mass index is 45.88 kg/m². Weight today: 259LBS  Comorbid conditions: Anxiety on zoloft. Goals for weight loss: no specific number. Maybe below 200. Diet history:     Breakfast: skips    Lunch: small    Dinner: very large    Snacks: Only after dinner. Chocolate milk or cereal.    Drinks: no drinks with calories    Cravings: post dinner sweet cravings. Chocolate milk    Dietary preferences/cultural restrictions: none    Has patient ever been on medications for weight loss? If so, which ones, how long, and how effective were they?: Was on phentermine for almost a year and stopped as she was going through a lot. Wants to be back on phentermine. Lost around 60 lbs on it.  Back to her baseline weight now since stopping    Has patient ever had surgery for weight loss?: no    PE:  Visit Vitals  /70 (BP 1 Location: Right arm, BP Patient Position: Sitting, BP Cuff Size: Adult)   Pulse 97   Temp 97.4 °F (36.3 °C) (Oral)   Resp 16   Ht 5' 3\" (1.6 m)   Wt 259 lb (117.5 kg)   SpO2 99%   BMI 45.88 kg/m²     Gen: Pt sitting in chair, in NAD  Head: Normocephalic, atraumatic  Eyes: Sclera anicteric, EOM grossly intact  Throat: MMM, normal lips, tongue, teeth and gums  Neck: Supple, no LAD  CVS: Normal S1, S2, no m/r/g  Resp: CTAB, no wheezes or rales  Abd: Soft, non-tender, non-distended, +BS  Extrem: Atraumatic, no cyanosis or edema  Pulses: 2+  Skin: Warm, dry  Neuro: Alert, oriented, appropriate    Pertinent labs:  No results found for: HBA1C, ZSZ6LUXF, WXA5VSAG, LDE2ZQHY  Lab Results   Component Value Date/Time    Sodium 139 06/19/2017 09:46 AM    Potassium 4.4 06/19/2017 09:46 AM    Chloride 104 06/19/2017 09:46 AM    CO2 25 06/19/2017 09:46 AM    Anion gap 10 06/19/2017 09:46 AM    Glucose 93 06/19/2017 09:46 AM    BUN 12 06/19/2017 09:46 AM    Creatinine 0.67 06/19/2017 09:46 AM    BUN/Creatinine ratio 18 06/19/2017 09:46 AM    GFR est AA >60 06/19/2017 09:46 AM    GFR est non-AA >60 06/19/2017 09:46 AM    Calcium 8.5 06/19/2017 09:46 AM    Bilirubin, total 0.3 06/19/2017 09:46 AM    Alk. phosphatase 61 06/19/2017 09:46 AM    Protein, total 6.9 06/19/2017 09:46 AM    Albumin 3.6 06/19/2017 09:46 AM    Globulin 3.3 06/19/2017 09:46 AM    A-G Ratio 1.1 06/19/2017 09:46 AM    ALT (SGPT) 49 06/19/2017 09:46 AM     No results found for: CHOL, CHOLPOCT, CHOLX, CHLST, CHOLV, HDL, HDLPOC, HDLP, LDL, LDLCPOC, LDLC, DLDLP, VLDLC, VLDL, TGLX, TRIGL, TRIGP, TGLPOCT, CHHD, CHHDX  No results found for: TSH, TSH2, TSH3, TSHP, TSHEXT, TSHEXT    A/P: Pt is a 50 y.o. F who present for initial weight loss clinic visit. - Diet history taken  - Shared decision making discussion had regarding weight loss goal: 200lbs goal  - Medications discussed today and decision made to start phentermine 37.5 every day  - Weight loss surgery discussed today and decision made to defer  - RTC in 1 month for follow up    Due for screening colonoscopy:  - Screening colonoscopy ordered    I have reviewed/discussed the above normal BMI with the patient. I have recommended the following interventions: dietary management education, guidance, and counseling, monitor weight and prescribed dietary intake .

## 2022-04-05 NOTE — PROGRESS NOTES
1. \"Have you been to the ER, urgent care clinic since your last visit? Hospitalized since your last visit? \" No    2. \"Have you seen or consulted any other health care providers outside of the [address] [place] [address] since your last visit? \" No     3. For patients aged 39-70: Has the patient had a colonoscopy / FIT/ Cologuard? NA - based on age      If the patient is female:    4. For patients aged 41-77: Has the patient had a mammogram within the past 2 years? Yes - no Care Gap present      5. For patients aged 21-65: Has the patient had a pap smear? Yes - Care Gap present.  Rooming MA/LPN to request most recent results    Health Maintenance Due   Topic Date Due    Hepatitis C Screening  Never done    Pneumococcal 0-64 years (1 of 2 - PPSV23) Never done    Cervical cancer screen  Never done    DTaP/Tdap/Td series (2 - Tdap) 08/16/2017    Colorectal Cancer Screening Combo  Never done    COVID-19 Vaccine (3 - Booster for Moderna series) 07/17/2021

## 2022-05-03 ENCOUNTER — VIRTUAL VISIT (OUTPATIENT)
Dept: FAMILY MEDICINE CLINIC | Age: 47
End: 2022-05-03
Payer: COMMERCIAL

## 2022-05-03 DIAGNOSIS — E66.01 CLASS 3 SEVERE OBESITY DUE TO EXCESS CALORIES WITH SERIOUS COMORBIDITY AND BODY MASS INDEX (BMI) OF 45.0 TO 49.9 IN ADULT (HCC): Primary | ICD-10-CM

## 2022-05-03 PROCEDURE — 99213 OFFICE O/P EST LOW 20 MIN: CPT | Performed by: STUDENT IN AN ORGANIZED HEALTH CARE EDUCATION/TRAINING PROGRAM

## 2022-05-03 RX ORDER — PHENTERMINE HYDROCHLORIDE 37.5 MG/1
37.5 TABLET ORAL
Qty: 30 TABLET | Refills: 0 | Status: SHIPPED | OUTPATIENT
Start: 2022-05-03 | End: 2022-05-20

## 2022-05-03 NOTE — PROGRESS NOTES
Scott Mireles  50 y.o. female  1975  [address] [address] [address]  [id number]    Telemedicine Progress Note  Marion Hanna MD       Encounter Date and Time: May 3, 2022 at 8:47 AM    Consent: Scott Mireles, who was seen by synchronous (real-time) audio-video technology, and/or her healthcare decision maker, is aware that this patient-initiated, Telehealth encounter on 5/3/2022 is a billable service, with coverage as determined by her insurance carrier. She is aware that she may receive a bill and has provided verbal consent to proceed: Yes. History of Present Illness   Scott Mireles is a 50 y.o. female was evaluated by synchronous (real-time) audio-video technology from home, through a secure patient portal.    CC: Pt is a 50 y.o. F who presents for weight loss counseling follow-up visit. Last seen for weight loss counseling on: 4/5/22    Weight today: 249  Weight from last visit: 259  Weight on initial weight loss clinic visit: 259   Comorbid conditions: Anxiety. Stopped zoloft. 3 days after starting phentermine her BP was 160/90 and she got a headache. She decided to stop the zoloft. Goals for weight loss: no specific number. Maybe below 200. HPI:     Diet: Better. Eats only when she gets hungry. Smaller meals. Drinking tons of water. Exercise: \"Always on the go\". Threats: Craves cereal at night. Twice over the last 2 weeks she has had cereal.        Review of Systems   ROS    Vitals/Objective:     General: alert, cooperative, no distress   Mental  status: mental status: alert, oriented to person, place, and time, normal mood, behavior, speech, dress, motor activity, and thought processes   Resp: resp: normal effort and no respiratory distress   Neuro: neuro: no gross deficits   Skin: skin: no discoloration or lesions of concern on visible areas   Due to this being a TeleHealth evaluation, many elements of the physical examination are unable to be assessed.       Assessment and Plan:       1. Class 3 severe obesity due to excess calories with serious comorbidity and body mass index (BMI) of 45.0 to 49.9 in adult (HCC)  - Weight loss goal remains at 200  - Continue checking BPs daily  - Continue phentermine 37.5 every day  - Add 10 minutes of walking per day  - RTC in 1 month for follow up (in person)      I have reviewed/discussed the above normal BMI with the patient. I have recommended the following interventions: dietary management education, guidance, and counseling, monitor weight and prescribed dietary intake . Time spent in direct conversation with the patient to include medical condition(s) discussed, assessment and treatment plan:       We discussed the expected course, resolution and complications of the diagnosis(es) in detail. Medication risks, benefits, costs, interactions, and alternatives were discussed as indicated. I advised her to contact the office if her condition worsens, changes or fails to improve as anticipated. She expressed understanding with the diagnosis(es) and plan. Patient understands that this encounter was a temporary measure, and the importance of further follow up and examination was emphasized. Patient verbalized understanding. Patient informed to follow up: 4 weeks    Electronically Signed: Lele Manuel MD    CPT Codes 23215-11918 for Established Patients may apply to this Telehealth Visit. POS code: 18. Modifier GT    Essence Juárez is a 50 y.o. female who was evaluated by an audio-video encounter for concerns as above. Patient identification was verified prior to start of the visit. A caregiver was present when appropriate. Due to this being a TeleHealth encounter (During WFWVY-08 public health emergency), evaluation of the following organ systems was limited: Vitals/Constitutional/EENT/Resp/CV/GI//MS/Neuro/Skin/Heme-Lymph-Imm.   Pursuant to the emergency declaration under the [facility], [address] waiver authority and the [employer] Act, this Virtual Visit was conducted, with patient's (and/or legal guardian's) consent, to reduce the patient's risk of exposure to COVID-19 and provide necessary medical care. Services were provided through a synchronous discussion virtually to substitute for in-person clinic visit. I was at home. The patient was at home. History   Patients past medical, surgical and family histories were reviewed and updated.       Past Medical History:   Diagnosis Date    GERD (gastroesophageal reflux disease)     Headache     Hearing loss     Palpitations     PVC's noted in past; workup by Caridology in past [place]     Past Surgical History:   Procedure Laterality Date    HX CHOLECYSTECTOMY      HX DILATION AND CURETTAGE      HX HEENT  1978    adenoids    HX TONSILLECTOMY  1979    NEUROLOGICAL PROCEDURE UNLISTED      subdural hematoma-age 2     Family History   Problem Relation Age of Onset    Headache Father     Neuropathy Father     Diabetes Father     Neuropathy Sister     Diabetes Sister     Cancer Maternal Grandmother     Cancer Paternal Grandmother     Breast Cancer Paternal Grandmother     Cancer Paternal Grandfather     Headache Other     Breast Cancer Paternal Aunt     Breast Cancer Cousin      Social History     Socioeconomic History    Marital status:      Spouse name: Not on file    Number of children: Not on file    Years of education: Not on file    Highest education level: Not on file   Occupational History    Not on file   Tobacco Use    Smoking status: Current Some Day Smoker     Years: 19.00    Smokeless tobacco: Never Used    Tobacco comment: socially    Substance and Sexual Activity    Alcohol use: Yes     Comment: Rarely     Drug use: No    Sexual activity: Yes     Partners: Male     Birth control/protection: None     Comment:     Other Topics Concern    Not on file   Social History Narrative    Not on file     Social Determinants of Health     Financial Resource Strain:     Difficulty of Paying Living Expenses: Not on file   Food Insecurity:     Worried About Running Out of Food in the Last Year: Not on file    Maricarmen of Food in the Last Year: Not on file   Transportation Needs:     Lack of Transportation (Medical): Not on file    Lack of Transportation (Non-Medical): Not on file   Physical Activity:     Days of Exercise per Week: Not on file    Minutes of Exercise per Session: Not on file   Stress:     Feeling of Stress : Not on file   Social Connections:     Frequency of Communication with Friends and Family: Not on file    Frequency of Social Gatherings with Friends and Family: Not on file    Attends Sabianism Services: Not on file    Active Member of [address] [place] [address] [employer] or Organizations: Not on file    Attends Club or Organization Meetings: Not on file    Marital Status: Not on file   Intimate Partner Violence:     Fear of Current or Ex-Partner: Not on file    Emotionally Abused: Not on file    Physically Abused: Not on file    Sexually Abused: Not on file   Housing Stability:     Unable to Pay for Housing in the Last Year: Not on file    Number of [place] in the Last Year: Not on file    Unstable Housing in the Last Year: Not on file     Patient Active Problem List   Diagnosis Code    Abnormal EKG R94.31    Severe obesity with body mass index (BMI) of 35.0 to 39.9 with serious comorbidity ([facility]) E66.01    GERD (gastroesophageal reflux disease) K21.9    Adjustment disorder with anxious mood F43.22          Current Medications/Allergies   Medications and Allergies reviewed:    Current Outpatient Medications   Medication Sig Dispense Refill    phentermine (ADIPEX-P) 37.5 mg tablet Take 1 Tablet by mouth every morning.  Max Daily Amount: 37.5 mg. 30 Tablet 0    sertraline (ZOLOFT) 25 mg tablet TAKE 1 TABLET DAILY 90 Tablet 1    esomeprazole (NEXIUM) 40 mg capsule TAKE 1 CAPSULE DAILY 90 Capsule 1    hydrOXYzine HCL (ATARAX) 25 mg tablet Take 1 Tab by mouth three (3) times daily as needed for Itching, Anxiety or Sleep.  90 Tab 11     Allergies   Allergen Reactions    Bactrim [Sulfamethoprim Ds] Hives    Shellfish Derived Hives and Itching    Sulfa (Sulfonamide Antibiotics) Hives

## 2022-05-04 NOTE — PROGRESS NOTES
[address] Medicine Residency Attending Addendum:  Dr. Abena Addison MD,  the patient and I were not physically present during this encounter. The resident and I are concurrently monitoring the patient care through appropriate telecommunication technology. I discussed the findings, assessment and plan with the resident and agree with the resident's findings and plan as documented in the resident's note.       Genie Cisneros MD

## 2022-05-16 ENCOUNTER — TELEPHONE (OUTPATIENT)
Dept: FAMILY MEDICINE CLINIC | Age: 47
End: 2022-05-16

## 2022-05-16 DIAGNOSIS — E66.01 CLASS 3 SEVERE OBESITY DUE TO EXCESS CALORIES WITH SERIOUS COMORBIDITY AND BODY MASS INDEX (BMI) OF 45.0 TO 49.9 IN ADULT (HCC): Primary | ICD-10-CM

## 2022-05-16 NOTE — TELEPHONE ENCOUNTER
Pharmacy requests per insurance   phentermine (ADIPEX-P) 37.5 mg tablet     Preferred alternative     phentermine (ADIPEX-P) 37.5 mg capsule

## 2022-05-20 RX ORDER — PHENTERMINE HYDROCHLORIDE 37.5 MG/1
37.5 CAPSULE ORAL
Qty: 30 CAPSULE | Refills: 0 | Status: SHIPPED | OUTPATIENT
Start: 2022-05-20 | End: 2022-05-31 | Stop reason: SDUPTHER

## 2022-05-20 NOTE — TELEPHONE ENCOUNTER
If you could please order capsules for this medicine   phentermine (ADIPEX-P) 37.5 mg  Insurance prefers capsules  Thank you

## 2022-05-31 ENCOUNTER — OFFICE VISIT (OUTPATIENT)
Dept: FAMILY MEDICINE CLINIC | Age: 47
End: 2022-05-31
Payer: COMMERCIAL

## 2022-05-31 VITALS
WEIGHT: 245 LBS | DIASTOLIC BLOOD PRESSURE: 78 MMHG | BODY MASS INDEX: 43.41 KG/M2 | RESPIRATION RATE: 20 BRPM | SYSTOLIC BLOOD PRESSURE: 128 MMHG | TEMPERATURE: 97.3 F | HEIGHT: 63 IN | HEART RATE: 104 BPM | OXYGEN SATURATION: 100 %

## 2022-05-31 DIAGNOSIS — E66.01 CLASS 3 SEVERE OBESITY DUE TO EXCESS CALORIES WITH SERIOUS COMORBIDITY AND BODY MASS INDEX (BMI) OF 45.0 TO 49.9 IN ADULT (HCC): Primary | ICD-10-CM

## 2022-05-31 DIAGNOSIS — Z71.3 WEIGHT LOSS COUNSELING, ENCOUNTER FOR: ICD-10-CM

## 2022-05-31 DIAGNOSIS — F41.1 GAD (GENERALIZED ANXIETY DISORDER): ICD-10-CM

## 2022-05-31 PROCEDURE — 99214 OFFICE O/P EST MOD 30 MIN: CPT | Performed by: STUDENT IN AN ORGANIZED HEALTH CARE EDUCATION/TRAINING PROGRAM

## 2022-05-31 RX ORDER — PHENTERMINE HYDROCHLORIDE 37.5 MG/1
37.5 CAPSULE ORAL
Qty: 30 CAPSULE | Refills: 0 | Status: SHIPPED | OUTPATIENT
Start: 2022-06-15 | End: 2022-06-30 | Stop reason: SDUPTHER

## 2022-05-31 RX ORDER — SERTRALINE HYDROCHLORIDE 25 MG/1
25 TABLET, FILM COATED ORAL DAILY
Qty: 90 TABLET | Refills: 3 | Status: SHIPPED | OUTPATIENT
Start: 2022-05-31

## 2022-05-31 RX ORDER — HYDROXYZINE 25 MG/1
25 TABLET, FILM COATED ORAL
Qty: 90 TABLET | Refills: 5 | Status: SHIPPED | OUTPATIENT
Start: 2022-05-31 | End: 2022-09-02 | Stop reason: SDUPTHER

## 2022-05-31 RX ORDER — SERTRALINE HYDROCHLORIDE 25 MG/1
25 TABLET, FILM COATED ORAL DAILY
COMMUNITY
End: 2022-05-31 | Stop reason: SDUPTHER

## 2022-05-31 NOTE — PROGRESS NOTES
Guevara Miranda (: 1975) is a 50 y.o. female, established patient, here for evaluation of the following chief complaint(s):  No chief complaint on file. Assessment/Plan:  {There are no diagnoses linked to this encounter. (Refresh or delete this SmartLink)}  No follow-ups on file. Subjective/Objective:  HPI  ***      Physical Exam  Height 5' 3\" (1.6 m), weight 245 lb (111.1 kg). Body mass index is 43.4 kg/m². General appearance - Alert, NAD. Head - Atraumatic. Normocephalic. No lymphadenopathy  Eyes - EOMI. Sclera white. Ears - Hearing grossly normal.    Nose - Nares patent, no polyps  Throat - pharynx clear, no exudates. Respiratory - LCTAB. No wheeze/rale/rhonchi  Heart - Normal rate, regular rhythm. No m/r/r  Abdomen - Soft, non tender. Non distended. Neurological - No focal deficits. Speech normal.   Musculoskeletal - Normal ROM, Gait normal.    Extremities - No LE edema. Distal pulses intact  Skin - normal coloration and normal turgor. No cyanosis, no rash. Medical History- Reviewed Social History- Reviewed Surgical History- Reviewed   Ness Pryor has a past medical history of GERD (gastroesophageal reflux disease), Headache, Hearing loss, and Palpitations. Ness Pryor reports that she has been smoking. She has smoked for the past 19.00 years. She has never used smokeless tobacco. She reports current alcohol use. She reports that she does not use drugs. Ness Pryor has a past surgical history that includes neurological procedure unlisted; hx heent (); hx tonsillectomy (); hx dilation and curettage; and hx cholecystectomy.          Problem List- Reviewed   Ness Pryor has Abnormal EKG, Severe obesity with body mass index (BMI) of 35.0 to 39.9 with serious comorbidity ([address] GERD (gastroesophageal reflux disease), and Adjustment disorder with anxious mood on their problem list.       Current Outpatient Medications   Medication Instructions    esomeprazole (NEXIUM) 40 mg capsule TAKE 1 CAPSULE DAILY    hydrOXYzine HCL (ATARAX) 25 mg, Oral, 3 TIMES DAILY AS NEEDED    phentermine 37.5 mg, Oral, 7AM       {Time Documentation Optional:05892}    An electronic signature was used to authenticate this note.   -- Asif Genao MD

## 2022-05-31 NOTE — PROGRESS NOTES
Eros Monet (: 1975) is a 50 y.o. female, established patient, here for evaluation of the following chief complaint(s):  Weight Management       Assessment/Plan:  1. Class 3 severe obesity due to excess calories with serious comorbidity and body mass index (BMI) of 45.0 to 49.9 in adult [facility])- Continue with weight loss interventions including diet, exercise and pharmacologic assistance. Tachycardia is chronic and not worse with phentermine. 14lb weight loss over the last 2 months!   -     phentermine 37.5 mg capsule; Take 37.5 mg by mouth every morning. Max Daily Amount: 37.5 mg., Normal, Disp-30 Capsule, R-0  2. Weight loss counseling, encounter for  -     phentermine 37.5 mg capsule; Take 37.5 mg by mouth every morning. Max Daily Amount: 37.5 mg., Normal, Disp-30 Capsule, R-0  3. JOVANY (generalized anxiety disorder)- Need for refills of medications. Taking the atarax nightly. Stable on Zoloft 25mg daily. Attempted off of this a month ago but decided she needed it/did better with it. Has been on it for about 3 yrs. Previously listed as adjustment disorder with anxiety, however given chronicity she meets the criteria for JOVANY. -     hydrOXYzine HCL (ATARAX) 25 mg tablet; Take 1 Tablet by mouth three (3) times daily as needed for Itching, Anxiety or Sleep., Normal, Disp-90 Tablet, R-5  -     sertraline (Zoloft) 25 mg tablet; Take 1 Tablet by mouth daily. , Normal, Disp-90 Tablet, R-3    Return in about 1 month (around 2022) for weight loss follow up. Subjective/Objective:  HPI  Weight loss counseling: Has been consistent with her diet and exercise. Eats only when hungry which is the best way she has lost weight in the past. Ate and drank a little more this memorial day weekend. Exercise- 5x/week walk the driveway which is a mile. GOAL: Consistent 30min 5x/ week. Stress/Anxiety: Has restarted Zoloft after being off of it for a month due to a very stressful day.  Has had her symptoms well controlled now back on the 25mg. Physical Exam  Blood pressure 128/78, pulse (!) 104, temperature 97.3 °F (36.3 °C), temperature source Oral, resp. rate 20, height 5' 3\" (1.6 m), weight 245 lb (111.1 kg), SpO2 100 %. Body mass index is 43.4 kg/m². General appearance - Alert, NAD. Head - Atraumatic. Normocephalic. No lymphadenopathy  Eyes - EOMI. Sclera white. Respiratory - LCTAB. No wheeze/rale/rhonchi  Heart - Mild tachycardia, regular rhythm. No m/r/r  Neurological - No focal deficits. Speech normal.   Musculoskeletal - Normal ROM, Gait normal.    Extremities - No LE edema. Distal pulses intact  Skin - normal coloration and normal turgor. No cyanosis, no rash. Medical History- Reviewed Social History- Reviewed Surgical History- Reviewed   Beverley Lion has a past medical history of GERD (gastroesophageal reflux disease), Headache, Hearing loss, and Palpitations. Beverley Lion reports that she has been smoking. She has smoked for the past 19.00 years. She has never used smokeless tobacco. She reports current alcohol use. She reports that she does not use drugs. Beverley Lion has a past surgical history that includes neurological procedure unlisted; hx heent (1978); hx tonsillectomy (1979); hx dilation and curettage; and hx cholecystectomy. Problem List- Reviewed   Beverley Lion has Abnormal EKG, Severe obesity with body mass index (BMI) of 35.0 to 39.9 with serious comorbidity ([address] GERD (gastroesophageal reflux disease), and Adjustment disorder with anxious mood on their problem list.       Current Outpatient Medications   Medication Instructions    esomeprazole (NEXIUM) 40 mg capsule TAKE 1 CAPSULE DAILY    hydrOXYzine HCL (ATARAX) 25 mg, Oral, 3 TIMES DAILY AS NEEDED    [START ON 6/15/2022] phentermine 37.5 mg, Oral, 7AM    sertraline (ZOLOFT) 25 mg, Oral, DAILY           An electronic signature was used to authenticate this note.   -- Veronika Ruvalcaba MD

## 2022-05-31 NOTE — PROGRESS NOTES
1. \"Have you been to the ER, urgent care clinic since your last visit? Hospitalized since your last visit? \" No    2. \"Have you seen or consulted any other health care providers outside of the [address] [place] [address] since your last visit? \" No     3. For patients aged 39-70: Has the patient had a colonoscopy / FIT/ Cologuard? No      If the patient is female:    4. For patients aged 41-77: Has the patient had a mammogram within the past 2 years? Yes - no Care Gap present      5. For patients aged 21-65: Has the patient had a pap smear? Yes - Care Gap present.  Rooming MA/LPN to request most recent results    Health Maintenance Due   Topic Date Due    Hepatitis C Screening  Never done    Pneumococcal 0-64 years (1 - PCV) Never done    Cervical cancer screen  Never done    DTaP/Tdap/Td series (2 - Tdap) 08/16/2017    Colorectal Cancer Screening Combo  Never done    COVID-19 Vaccine (3 - Booster for Moderna series) 07/17/2021

## 2022-06-13 DIAGNOSIS — K22.10 EROSIVE ESOPHAGITIS: ICD-10-CM

## 2022-06-14 RX ORDER — ESOMEPRAZOLE MAGNESIUM 40 MG/1
CAPSULE, DELAYED RELEASE ORAL
Qty: 90 CAPSULE | Refills: 1 | Status: SHIPPED | OUTPATIENT
Start: 2022-06-14 | End: 2022-11-04

## 2022-06-30 ENCOUNTER — VIRTUAL VISIT (OUTPATIENT)
Dept: FAMILY MEDICINE CLINIC | Age: 47
End: 2022-06-30
Payer: COMMERCIAL

## 2022-06-30 DIAGNOSIS — E66.01 MORBID OBESITY (HCC): Primary | ICD-10-CM

## 2022-06-30 DIAGNOSIS — Z71.3 WEIGHT LOSS COUNSELING, ENCOUNTER FOR: ICD-10-CM

## 2022-06-30 DIAGNOSIS — E66.01 CLASS 3 SEVERE OBESITY DUE TO EXCESS CALORIES WITH SERIOUS COMORBIDITY AND BODY MASS INDEX (BMI) OF 45.0 TO 49.9 IN ADULT (HCC): ICD-10-CM

## 2022-06-30 PROCEDURE — 99213 OFFICE O/P EST LOW 20 MIN: CPT | Performed by: FAMILY MEDICINE

## 2022-06-30 RX ORDER — PHENTERMINE HYDROCHLORIDE 37.5 MG/1
37.5 CAPSULE ORAL
Qty: 30 CAPSULE | Refills: 0 | Status: SHIPPED | OUTPATIENT
Start: 2022-06-30 | End: 2022-08-02 | Stop reason: SDUPTHER

## 2022-06-30 NOTE — PROGRESS NOTES
Mariajose Silva is a 50 y.o. female who was seen by synchronous (real-time) audio-video technology. Consent:  Patient and/or their healthcare decision maker is aware that this patient-initiated Telehealth encounter is a billable service, with coverage as determined by their insurance carrier. They are aware that they may receive a bill and have provided verbal consent to proceed: Yes    I was in the office while conducting this encounter. Platform: Doxy. me    HPI: Pt is a 50 y.o. female who presents for f/u weight loss clinic. She is doing well on the phentermine and has lost another 7lbs in the past month, despite not taking the medication for one week while she was on vacation. She weights 242lbs as of this morning. She has no side effects to the medication other than some dry mouth which is tolerable. BP this morning was 129/79. She is still working on diet with portion control and was able to do this even on her vacation. Past Medical History:   Diagnosis Date    GERD (gastroesophageal reflux disease)     Headache     Hearing loss     Palpitations     PVC's noted in past; workup by Caridology in past [place]       Family History   Problem Relation Age of Onset    Headache Father     Neuropathy Father     Diabetes Father     Neuropathy Sister     Diabetes Sister     Cancer Maternal Grandmother     Cancer Paternal Grandmother     Breast Cancer Paternal Grandmother     Cancer Paternal Grandfather     Headache Other     Breast Cancer Paternal Aunt     Breast Cancer Cousin        Social History     Tobacco Use    Smoking status: Current Some Day Smoker     Years: 19.00    Smokeless tobacco: Never Used    Tobacco comment: socially    Substance Use Topics    Alcohol use: Yes     Comment: Rarely     Drug use: No       ROS:  Per HPI    PE:  There were no vitals taken for this visit.   Gen: Pt in NAD  Head: Normocephalic, atraumatic  Eyes: Sclera anicteric, EOM grossly intact  Throat: MMM  Neck: Supple  Resp: Speaking easily in full sentences without respiratory distress  Neuro: Alert, oriented, appropriate      A/P:   Encounter Diagnoses     ICD-10-CM ICD-9-CM   1. Morbid obesity ([facility]  E66.01 278.01   2. Class 3 severe obesity due to excess calories with serious comorbidity and body mass index (BMI) of 45.0 to 49.9 in adult ([facility])  E66.01 278.01    Z68.42 V85.42   3. Weight loss counseling, encounter for  Z71.3 V65.3     1. Morbid obesity ([facility]): This will be month 4/6 of phentermine. Pt doing well and continuing to lose weight. - phentermine 37.5 mg capsule; Take 37.5 mg by mouth every morning. Max Daily Amount: 37.5 mg.  Dispense: 30 Capsule; Refill: 0  - I have reviewed/discussed the above normal BMI with the patient. I have recommended the following interventions: dietary management education, guidance, and counseling . [address]ol[address] Post 3. Weight loss counseling, encounter for  - phentermine 37.5 mg capsule; Take 37.5 mg by mouth every morning. Max Daily Amount: 37.5 mg.  Dispense: 30 Capsule; Refill: 0       RTC in 4 weeks for weight loss clinic, or sooner prn    Discussed diagnoses in detail with patient. Medication risks/benefits/side effects discussed with patient. All of the patient's questions were addressed. The patient understands and agrees with our plan of care. The patient knows to call back if they are unsure of or forget any changes we discussed today or if the symptoms change. Semaj Haile is a 50 y.o. female being evaluated by a video visit encounter for concerns as above. A caregiver was present when appropriate. Due to this being a TeleHealth encounter (During XVBA-15 public health emergency), evaluation of the following organ systems was limited: Vitals/Constitutional/EENT/Resp/CV/GI//MS/Neuro/Skin/Heme-Lymph-Imm.   Pursuant to the emergency declaration under the [facility] waiver authority and the [employer]ropri[employer] Act, this Virtual  Visit was conducted, with patient's (and/or legal guardian's) consent, to reduce the patient's risk of exposure to COVID-19 and provide necessary medical care. Services were provided through a video synchronous discussion virtually to substitute for in-person clinic visit. Patient and provider were located in their home and in the office, respectively. Current Outpatient Medications on File Prior to Visit   Medication Sig Dispense Refill    esomeprazole (NEXIUM) 40 mg capsule TAKE 1 CAPSULE DAILY 90 Capsule 1    hydrOXYzine HCL (ATARAX) 25 mg tablet Take 1 Tablet by mouth three (3) times daily as needed for Itching, Anxiety or Sleep. 90 Tablet 5    sertraline (Zoloft) 25 mg tablet Take 1 Tablet by mouth daily. 90 Tablet 3     No current facility-administered medications on file prior to visit.

## 2022-08-01 ENCOUNTER — VIRTUAL VISIT (OUTPATIENT)
Dept: FAMILY MEDICINE CLINIC | Age: 47
End: 2022-08-01
Payer: COMMERCIAL

## 2022-08-01 DIAGNOSIS — E66.01 CLASS 3 SEVERE OBESITY DUE TO EXCESS CALORIES WITH SERIOUS COMORBIDITY AND BODY MASS INDEX (BMI) OF 45.0 TO 49.9 IN ADULT (HCC): ICD-10-CM

## 2022-08-01 DIAGNOSIS — Z71.3 WEIGHT LOSS COUNSELING, ENCOUNTER FOR: ICD-10-CM

## 2022-08-01 PROCEDURE — 99442 PR PHYS/QHP TELEPHONE EVALUATION 11-20 MIN: CPT | Performed by: FAMILY MEDICINE

## 2022-08-01 NOTE — PROGRESS NOTES
Lakia Spencer  50 y.o. female  1975  [id number] [facility] [id number]  [id number]    [phone] (home)      [facility]:    Telephone Encounter  Luis Corley MD       Encounter Date: 8/1/2022 at 12:04 PM    Consent:  She and/or the health care decision maker is aware that that she may receive a bill for this telephone service, depending on her insurance coverage, and has provided verbal consent to proceed: Yes    No chief complaint on file. History of Present Illness   Lakia Spencer is a 50 y.o. female was evaluated by telephone. I communicated with the patient and/or health care decision maker about weight loss clinic. She is doing well on the phentermine. She has had some hot flashes that she attributes to the phentermine, but they are not bothersome to the point of wanting to stop the medication. She is sleeping well and feels like her appetite is less, although not as much as the first time she did a course of phentermine. She is still doing portion control and minimizing snacking. Weight today was 237lb, down 22lbs from four months ago. Review of Systems   Per HPI    Vitals/Objective:   General: Patient speaking in complete sentences without effort. Normal speech and cooperative. Due to this being a Virtual Check-in/Telephone evaluation, many elements of the physical examination are unable to be assessed. Assessment and Plan:   Time-based coding, delete if not needed: I spent at least 10 minutes with this established patient, and >50% of the time was spent counseling and/or coordinating care regarding weight loss counseling  Total Time: minutes: 11-20 minutes      1. Class 3 severe obesity due to excess calories with serious comorbidity and body mass index (BMI) of 45.0 to 49.9 in [facility]):  This will be month 5/6 for phentermine - rx history a little confusing in the chart based on rx having to be rewritten several times, but per pt and  she has filled it 4 times so far. Will discuss switching to Vyvanse, Stephanie Abt or [facility] at that time. - phentermine 37.5 mg capsule; Take 37.5 mg by mouth every morning. Max Daily Amount: 37.5 mg.  Dispense: 30 Capsule; Refill: 0    2. Weight loss counseling, encounter for  - phentermine 37.5 mg capsule; Take 37.5 mg by mouth every morning. Max Daily Amount: 37.5 mg.  Dispense: 30 Capsule; Refill: 0      RTC in 4 weeks for weight loss clinic, or sooner prn    We discussed the expected course, resolution and complications of the diagnosis(es) in detail. Medication risks, benefits, costs, interactions, and alternatives were discussed as indicated. I advised her to contact the office if her condition worsens, changes or fails to improve as anticipated. She expressed understanding with the diagnosis(es) and plan. Patient understands that this encounter was a temporary measure, and the importance of further follow up and examination was emphasized. Patient verbalized understanding. I affirm this is a Patient Initiated Episode with an Established Patient who has not had a related appointment within my department in the past 7 days or scheduled within the next 24 hours. Note: not billable if this call serves to triage the patient into an appointment for the relevant concern      Electronically Signed: Daisy Bautista MD  Providers location when delivering service: In the office        ICD-10-CM ICD-9-CM    1. Class 3 severe obesity due to excess calories with serious comorbidity and body mass index (BMI) of 45.0 to 49.9 [facility] ([facility]  E66.01 278.01 phentermine 37.5 mg capsule    Z68.42 V85.42       2. Weight loss counseling, encounter for  Z71.3 V65. 3 phentermine 37.5 mg capsule          Pursuant to the emergency declaration under the [facility], [address] [place] [address] and the [employer] and [employer] Act, this Virtual  Visit was conducted, with patient's consent, to reduce the patient's risk of exposure to COVID-19 and provide continuity of care for an established patient.       History     Past Medical History:   Diagnosis Date    GERD (gastroesophageal reflux disease)     Headache     Hearing loss     Palpitations     PVC's noted in past; workup by Caridology in past [place]     Past Surgical History:   Procedure Laterality Date    HX CHOLECYSTECTOMY      HX DILATION AND CURETTAGE      HX HEENT  1978    adenoids    HX TONSILLECTOMY  1979    NEUROLOGICAL PROCEDURE UNLISTED      subdural hematoma-age 2     Family History   Problem Relation Age of Onset    Headache Father     Neuropathy Father     Diabetes Father     Neuropathy Sister     Diabetes Sister     Cancer Maternal Grandmother     Cancer Paternal Grandmother     Breast Cancer Paternal Grandmother     Cancer Paternal Grandfather     Headache Other     Breast Cancer Paternal Aunt     Breast Cancer Cousin      Social History     Socioeconomic History    Marital status:      Spouse name: Not on file    Number of children: Not on file    Years of education: Not on file    Highest education level: Not on file   Occupational History    Not on file   Tobacco Use    Smoking status: Some Days     Years: 19.00     Types: Cigarettes    Smokeless tobacco: Never    Tobacco comments:     socially    Substance and Sexual Activity    Alcohol use: Yes     Comment: Rarely     Drug use: No    Sexual activity: Yes     Partners: Male     Birth control/protection: None     Comment:     Other Topics Concern    Not on file   Social History Narrative    Not on file     Social Determinants of Health     Financial Resource Strain: Not on file   Food Insecurity: Not on file   Transportation Needs: Not on file   Physical Activity: Not on file   Stress: Not on file   Social Connections: Not on file   Intimate Partner Violence: Not on file   Housing Stability: Not on file            Current Medications/Allergies   Medications and Allergies reviewed:    Current Outpatient Medications   Medication Sig Dispense Refill    phentermine 37.5 mg capsule Take 37.5 mg by mouth every morning. Max Daily Amount: 37.5 mg. 30 Capsule 0    esomeprazole (NEXIUM) 40 mg capsule TAKE 1 CAPSULE DAILY 90 Capsule 1    hydrOXYzine HCL (ATARAX) 25 mg tablet Take 1 Tablet by mouth three (3) times daily as needed for Itching, Anxiety or Sleep. 90 Tablet 5    sertraline (Zoloft) 25 mg tablet Take 1 Tablet by mouth daily.  90 Tablet 3     Allergies   Allergen Reactions    Bactrim [Sulfamethoprim Ds] Hives    Shellfish Derived Hives and Itching    Sulfa (Sulfonamide Antibiotics) Hives

## 2022-08-02 RX ORDER — PHENTERMINE HYDROCHLORIDE 37.5 MG/1
37.5 CAPSULE ORAL
Qty: 30 CAPSULE | Refills: 0 | Status: SHIPPED | OUTPATIENT
Start: 2022-08-02 | End: 2022-09-02 | Stop reason: ALTCHOICE

## 2022-09-02 ENCOUNTER — VIRTUAL VISIT (OUTPATIENT)
Dept: FAMILY MEDICINE CLINIC | Age: 47
End: 2022-09-02
Payer: COMMERCIAL

## 2022-09-02 DIAGNOSIS — E66.01 SEVERE OBESITY WITH BODY MASS INDEX (BMI) OF 35.0 TO 39.9 WITH SERIOUS COMORBIDITY (HCC): Primary | ICD-10-CM

## 2022-09-02 DIAGNOSIS — F41.1 GAD (GENERALIZED ANXIETY DISORDER): ICD-10-CM

## 2022-09-02 DIAGNOSIS — L29.9 CHRONIC PRURITUS: ICD-10-CM

## 2022-09-02 PROCEDURE — 99214 OFFICE O/P EST MOD 30 MIN: CPT | Performed by: FAMILY MEDICINE

## 2022-09-02 RX ORDER — SEMAGLUTIDE 0.25 MG/.5ML
INJECTION, SOLUTION SUBCUTANEOUS
Qty: 6 EACH | Refills: 0 | Status: SHIPPED | OUTPATIENT
Start: 2022-09-02 | End: 2022-10-28

## 2022-09-02 RX ORDER — HYDROXYZINE 25 MG/1
25 TABLET, FILM COATED ORAL
Qty: 270 TABLET | Refills: 1 | Status: SHIPPED | OUTPATIENT
Start: 2022-09-02

## 2022-09-02 NOTE — PROGRESS NOTES
Kerry Valdivia is a 50 y.o. female who was seen by synchronous (real-time) audio-video technology. Consent:  Patient and/or their healthcare decision maker is aware that this patient-initiated Telehealth encounter is a billable service, with coverage as determined by their insurance carrier. They are aware that they may receive a bill and have provided verbal consent to proceed: Yes    I was in the office while conducting this encounter. Platform: Doxy. me    HPI: Pt is a 50 y.o. female who presents for follow-up weight loss clinic. She stopped her phentermine about 2 weeks ago because she was having palpitations. She has a h/o palpitations and has had a thorough work-up with Cardiology which did not reveal anything. The palpitations eventually stopped but she did not start the phentermine back. She is interested in other options for medications for weight loss and bought a Fit Bit to help with exercise. Her weight today was 236lb. She has been under a lot of stress with her sister in the hospital. She is still taking the Zoloft which helps. She needs a refill of her hydroxyzine. She uses this for chronic itching and normally takes one a day, sometimes needing 2.        Past Medical History:   Diagnosis Date    GERD (gastroesophageal reflux disease)     Headache     Hearing loss     Palpitations     PVC's noted in past; workup by Caridology in past [place]       Family History   Problem Relation Age of Onset    Headache Father     Neuropathy Father     Diabetes Father     Neuropathy Sister     Diabetes Sister     Cancer Maternal Grandmother     Cancer Paternal Grandmother     Breast Cancer Paternal Grandmother     Cancer Paternal Grandfather     Headache Other     Breast Cancer Paternal Aunt     Breast Cancer Cousin        Social History     Tobacco Use    Smoking status: Some Days     Years: 19.00     Types: Cigarettes    Smokeless tobacco: Never    Tobacco comments:     socially    Substance Use Topics Alcohol use: Yes     Comment: Rarely     Drug use: No       ROS:  Per HPI    PE:  There were no vitals taken for this visit. Gen: Pt in NAD  Head: Normocephalic, atraumatic  Eyes: Sclera anicteric, EOM grossly intact  Throat: MMM  Neck: Supple  Resp: Speaking easily in full sentences without respiratory distress  Neuro: Alert, oriented, appropriate      A/P:   Encounter Diagnoses     ICD-10-CM ICD-9-CM   1. Severe obesity with body mass index (BMI) of 35.0 to 39.9 with serious comorbidity (HCC)  E66.01 278.01   2. JOVANY (generalized anxiety disorder)  F41.1 300.02   3. Chronic pruritus  L29.9 698.9     1. Severe obesity with body mass index (BMI) of 35.0 to 39.9 with serious comorbidity [facility]): Discussed options, will do trial of Wegovy. If this is not covered will try Saxenda or Vyvanse. - semaglutide, weight loss, (Wegovy) 0.25 mg/0.5 mL pnij; 0.25 mg every seven (7) days for 28 days, THEN 0.5 mg every seven (7) days for 28 days. Dispense: 6 Each; Refill: 0  - I have reviewed/discussed the above normal BMI with the patient. I have recommended the following interventions: dietary management education, guidance, and counseling and monitor weight . .      2. JOVANY: Stable on Zoloft, continue current dose. 3. Chronic pruritus  - hydrOXYzine HCL (ATARAX) 25 mg tablet; Take 1 Tablet by mouth three (3) times daily as needed for Itching, Anxiety or Sleep. Dispense: 270 Tablet; Refill: 1      RTC in 3 months for f/u weight loss, or sooner prn    Discussed diagnoses in detail with patient. Medication risks/benefits/side effects discussed with patient. All of the patient's questions were addressed. The patient understands and agrees with our plan of care. The patient knows to call back if they are unsure of or forget any changes we discussed today or if the symptoms change. Leonor Caban is a 50 y.o. female being evaluated by a video visit encounter for concerns as above.   A caregiver was present when appropriate. Due to this being a TeleHealth encounter ([facility]), evaluation of the following organ systems was limited: Vitals/Constitutional/EENT/Resp/CV/GI//MS/Neuro/Skin/Heme-Lymph-Imm. Pursuant to the emergency declaration under the [address] [place] [address] waiver authority and the [employer] and [employer] Act, this Virtual  Visit was conducted, with patient's (and/or legal guardian's) consent, to reduce the patient's risk of exposure to COVID-19 and provide necessary medical care. Services were provided through a video synchronous discussion virtually to substitute for in-person clinic visit. Patient and provider were located in their home and in the office, respectively. Current Outpatient Medications on File Prior to Visit   Medication Sig Dispense Refill    esomeprazole (NEXIUM) 40 mg capsule TAKE 1 CAPSULE DAILY 90 Capsule 1    sertraline (Zoloft) 25 mg tablet Take 1 Tablet by mouth daily. 90 Tablet 3     No current facility-administered medications on file prior to visit.

## 2022-11-04 DIAGNOSIS — K22.10 EROSIVE ESOPHAGITIS: ICD-10-CM

## 2022-11-04 RX ORDER — ESOMEPRAZOLE MAGNESIUM 40 MG/1
CAPSULE, DELAYED RELEASE ORAL
Qty: 90 CAPSULE | Refills: 1 | Status: SHIPPED | OUTPATIENT
Start: 2022-11-04

## 2023-05-12 DIAGNOSIS — L05.91 PILONIDAL CYST: Primary | ICD-10-CM

## 2023-05-12 NOTE — PROGRESS NOTES
Pt called stating she [place] pilonidal cyst specialist [place] and he recommended she have an MRI done to determine whether she has a pilonidal cyst. Records requested and reviewed. Surgeon did not order this because he is out of state. Will put in order for MRI and pt to follow-up with specialist, or may need follow-up in our office pending results.

## 2023-06-08 RX ORDER — SERTRALINE HYDROCHLORIDE 25 MG/1
TABLET, FILM COATED ORAL
Qty: 90 TABLET | Refills: 0 | Status: SHIPPED | OUTPATIENT
Start: 2023-06-08

## 2023-06-09 RX ORDER — ESOMEPRAZOLE MAGNESIUM 40 MG/1
40 CAPSULE, DELAYED RELEASE ORAL DAILY
Qty: 90 CAPSULE | Refills: 0 | Status: SHIPPED | OUTPATIENT
Start: 2023-06-09

## 2023-07-19 ENCOUNTER — OFFICE VISIT (OUTPATIENT)
Facility: CLINIC | Age: 48
End: 2023-07-19
Payer: COMMERCIAL

## 2023-07-19 VITALS
WEIGHT: 262 LBS | HEART RATE: 98 BPM | SYSTOLIC BLOOD PRESSURE: 130 MMHG | BODY MASS INDEX: 46.42 KG/M2 | OXYGEN SATURATION: 100 % | HEIGHT: 63 IN | TEMPERATURE: 97.5 F | RESPIRATION RATE: 20 BRPM | DIASTOLIC BLOOD PRESSURE: 82 MMHG

## 2023-07-19 DIAGNOSIS — Z13.1 DIABETES MELLITUS SCREENING: ICD-10-CM

## 2023-07-19 DIAGNOSIS — Z11.59 NEED FOR HEPATITIS C SCREENING TEST: ICD-10-CM

## 2023-07-19 DIAGNOSIS — K21.9 GASTROESOPHAGEAL REFLUX DISEASE WITHOUT ESOPHAGITIS: Primary | ICD-10-CM

## 2023-07-19 DIAGNOSIS — Z87.2 H/O PILONIDAL CYST: ICD-10-CM

## 2023-07-19 DIAGNOSIS — Z23 IMMUNIZATION DUE: ICD-10-CM

## 2023-07-19 DIAGNOSIS — E78.2 MIXED HYPERLIPIDEMIA: ICD-10-CM

## 2023-07-19 DIAGNOSIS — F43.22 ADJUSTMENT DISORDER WITH ANXIOUS MOOD: ICD-10-CM

## 2023-07-19 DIAGNOSIS — E66.01 SEVERE OBESITY WITH BODY MASS INDEX (BMI) OF 35.0 TO 39.9 WITH SERIOUS COMORBIDITY (HCC): ICD-10-CM

## 2023-07-19 PROCEDURE — 99214 OFFICE O/P EST MOD 30 MIN: CPT | Performed by: FAMILY MEDICINE

## 2023-07-19 RX ORDER — M-VIT,TX,IRON,MINS/CALC/FOLIC 27MG-0.4MG
1 TABLET ORAL DAILY
Qty: 30 TABLET | Refills: 11 | COMMUNITY
Start: 2023-07-19

## 2023-07-19 RX ORDER — ZOSTER VACCINE RECOMBINANT, ADJUVANTED 50 MCG/0.5
0.5 KIT INTRAMUSCULAR SEE ADMIN INSTRUCTIONS
Qty: 0.5 ML | Refills: 1 | Status: SHIPPED | OUTPATIENT
Start: 2023-07-19 | End: 2024-01-15

## 2023-07-19 SDOH — ECONOMIC STABILITY: HOUSING INSECURITY
IN THE LAST 12 MONTHS, WAS THERE A TIME WHEN YOU DID NOT HAVE A STEADY PLACE TO SLEEP OR SLEPT IN A SHELTER (INCLUDING NOW)?: NO

## 2023-07-19 SDOH — ECONOMIC STABILITY: INCOME INSECURITY: HOW HARD IS IT FOR YOU TO PAY FOR THE VERY BASICS LIKE FOOD, HOUSING, MEDICAL CARE, AND HEATING?: NOT HARD AT ALL

## 2023-07-19 SDOH — ECONOMIC STABILITY: FOOD INSECURITY: WITHIN THE PAST 12 MONTHS, THE FOOD YOU BOUGHT JUST DIDN'T LAST AND YOU DIDN'T HAVE MONEY TO GET MORE.: NEVER TRUE

## 2023-07-19 SDOH — ECONOMIC STABILITY: FOOD INSECURITY: WITHIN THE PAST 12 MONTHS, YOU WORRIED THAT YOUR FOOD WOULD RUN OUT BEFORE YOU GOT MONEY TO BUY MORE.: NEVER TRUE

## 2023-07-19 ASSESSMENT — ENCOUNTER SYMPTOMS
APNEA: 0
CHEST TIGHTNESS: 0

## 2023-07-19 ASSESSMENT — PATIENT HEALTH QUESTIONNAIRE - PHQ9
1. LITTLE INTEREST OR PLEASURE IN DOING THINGS: 0
SUM OF ALL RESPONSES TO PHQ QUESTIONS 1-9: 0
SUM OF ALL RESPONSES TO PHQ QUESTIONS 1-9: 0
2. FEELING DOWN, DEPRESSED OR HOPELESS: 0
SUM OF ALL RESPONSES TO PHQ QUESTIONS 1-9: 0
SUM OF ALL RESPONSES TO PHQ QUESTIONS 1-9: 0
SUM OF ALL RESPONSES TO PHQ9 QUESTIONS 1 & 2: 0

## 2023-07-19 NOTE — PROGRESS NOTES
1. Have you been to the ER, urgent care clinic since your last visit? Hospitalized since your last visit?no    2. Have you seen or consulted any other health care providers outside of the [address] [place] [address] since your last visit? Include any pap smears or colon screening. no  Reviewed record in preparation for visit and have necessary documentation  Pt did not bring medication to office visit for review  opportunity was given for questions      3. For patients aged 43-73: Has the patient had a colonoscopy / FIT/ Cologuard? No      If the patient is female:    4. For patients aged 43-66: Has the patient had a mammogram within the past 2 years? no      5.  For patients aged 21-65: Has the patient had a pap smear? yes      Goals that were addressed and/or need to be completed during or after this appointment include   Health Maintenance Due   Topic Date Due    Pneumococcal 0-64 years Vaccine (1 - PCV) Never done    HIV screen  Never done    Hepatitis C screen  Never done    Lipids  Never done    DTaP/Tdap/Td vaccine (2 - Tdap) 08/16/2017    Colorectal Cancer Screen  Never done    COVID-19 Vaccine (5 - Booster for Moderna series) 04/14/2021    Shingles vaccine (1 of 2) Never done    Breast cancer screen  04/14/2023    Depression Screen  05/31/2023
Future    6. Mixed hyperlipidemia  - Lipid Panel; Future    7. Need for hepatitis C screening test  - Hepatitis C Antibody; Future    8. Immunization due  - zoster recombinant adjuvanted vaccine [facility]) 50 MCG/0.5ML SUSR injection; Inject 0.5 mLs into the muscle See Admin Instructions 1 dose now and repeat in 2-6 months  Dispense: 0.5 mL; Refill: 1       Follow-up and Dispositions    Return in about 4 weeks (around 8/16/2023). I have discussed the diagnosis with the patient and the intended plan as seen in the above orders. Social history, medical history, and labs were reviewed. The patient has received an after-visit summary and questions were answered concerning future plans. I have discussed medication side effects and warnings with the patient as well.     MD LARISA Salazar & GAVIN JOHN [facility]  07/19/23

## 2023-07-20 LAB
ALBUMIN SERPL-MCNC: 3.9 G/DL (ref 3.5–5)
ALBUMIN/GLOB SERPL: 1.2 (ref 1.1–2.2)
ALP SERPL-CCNC: 60 U/L (ref 45–117)
ALT SERPL-CCNC: 53 U/L (ref 12–78)
ANION GAP SERPL CALC-SCNC: 7 MMOL/L (ref 5–15)
AST SERPL-CCNC: 54 U/L (ref 15–37)
BILIRUB SERPL-MCNC: 0.3 MG/DL (ref 0.2–1)
BUN SERPL-MCNC: 14 MG/DL (ref 6–20)
BUN/CREAT SERPL: 25 (ref 12–20)
CALCIUM SERPL-MCNC: 9.4 MG/DL (ref 8.5–10.1)
CHLORIDE SERPL-SCNC: 105 MMOL/L (ref 97–108)
CHOLEST SERPL-MCNC: 176 MG/DL
CO2 SERPL-SCNC: 26 MMOL/L (ref 21–32)
CREAT SERPL-MCNC: 0.55 MG/DL (ref 0.55–1.02)
ERYTHROCYTE [DISTWIDTH] IN BLOOD BY AUTOMATED COUNT: 14.2 % (ref 11.5–14.5)
EST. AVERAGE GLUCOSE BLD GHB EST-MCNC: 123 MG/DL
GLOBULIN SER CALC-MCNC: 3.3 G/DL (ref 2–4)
GLUCOSE SERPL-MCNC: 101 MG/DL (ref 65–100)
HBA1C MFR BLD: 5.9 % (ref 4–5.6)
HCT VFR BLD AUTO: 45.1 % (ref 35–47)
HCV AB SERPL QL IA: NONREACTIVE
HDLC SERPL-MCNC: 50 MG/DL
HDLC SERPL: 3.5 (ref 0–5)
HGB BLD-MCNC: 14 G/DL (ref 11.5–16)
LDLC SERPL CALC-MCNC: 98 MG/DL (ref 0–100)
MCH RBC QN AUTO: 30.6 PG (ref 26–34)
MCHC RBC AUTO-ENTMCNC: 31 G/DL (ref 30–36.5)
MCV RBC AUTO: 98.5 FL (ref 80–99)
NRBC # BLD: 0 K/UL (ref 0–0.01)
NRBC BLD-RTO: 0 PER 100 WBC
PLATELET # BLD AUTO: 314 K/UL (ref 150–400)
PMV BLD AUTO: 10.6 FL (ref 8.9–12.9)
POTASSIUM SERPL-SCNC: 4.9 MMOL/L (ref 3.5–5.1)
PROT SERPL-MCNC: 7.2 G/DL (ref 6.4–8.2)
RBC # BLD AUTO: 4.58 M/UL (ref 3.8–5.2)
SODIUM SERPL-SCNC: 138 MMOL/L (ref 136–145)
TRIGL SERPL-MCNC: 140 MG/DL
VLDLC SERPL CALC-MCNC: 28 MG/DL
WBC # BLD AUTO: 6.3 K/UL (ref 3.6–11)

## 2023-07-26 ENCOUNTER — TRANSCRIBE ORDERS (OUTPATIENT)
Facility: HOSPITAL | Age: 48
End: 2023-07-26

## 2023-07-26 DIAGNOSIS — Z12.31 SCREENING MAMMOGRAM FOR BREAST CANCER: Primary | ICD-10-CM

## 2023-08-01 ENCOUNTER — HOSPITAL ENCOUNTER (OUTPATIENT)
Facility: HOSPITAL | Age: 48
Discharge: HOME | End: 2023-08-04
Payer: COMMERCIAL

## 2023-08-01 VITALS — BODY MASS INDEX: 46.42 KG/M2 | HEIGHT: 63 IN | WEIGHT: 262 LBS

## 2023-08-01 DIAGNOSIS — Z12.31 SCREENING MAMMOGRAM FOR BREAST CANCER: ICD-10-CM

## 2023-08-01 PROCEDURE — 77063 BREAST TOMOSYNTHESIS BI: CPT

## 2023-09-11 RX ORDER — ESOMEPRAZOLE MAGNESIUM 40 MG/1
40 CAPSULE, DELAYED RELEASE ORAL DAILY
Qty: 90 CAPSULE | Refills: 1 | Status: SHIPPED | OUTPATIENT
Start: 2023-09-11

## 2023-10-29 RX ORDER — SERTRALINE HYDROCHLORIDE 25 MG/1
TABLET, FILM COATED ORAL
Qty: 90 TABLET | Refills: 0 | Status: SHIPPED | OUTPATIENT
Start: 2023-10-29

## 2024-01-29 DIAGNOSIS — F43.22 ADJUSTMENT DISORDER WITH ANXIOUS MOOD: Primary | ICD-10-CM

## 2024-01-29 RX ORDER — SERTRALINE HYDROCHLORIDE 25 MG/1
25 TABLET, FILM COATED ORAL DAILY
Qty: 30 TABLET | Refills: 0 | Status: SHIPPED | OUTPATIENT
Start: 2024-01-29

## 2024-01-29 RX ORDER — HYDROXYZINE HYDROCHLORIDE 25 MG/1
25 TABLET, FILM COATED ORAL 3 TIMES DAILY PRN
Qty: 90 TABLET | Refills: 0 | Status: SHIPPED | OUTPATIENT
Start: 2024-01-29

## 2024-03-07 DIAGNOSIS — F43.22 ADJUSTMENT DISORDER WITH ANXIOUS MOOD: ICD-10-CM

## 2024-03-07 DIAGNOSIS — K21.9 GASTROESOPHAGEAL REFLUX DISEASE WITHOUT ESOPHAGITIS: Primary | ICD-10-CM

## 2024-03-07 RX ORDER — SERTRALINE HYDROCHLORIDE 25 MG/1
25 TABLET, FILM COATED ORAL DAILY
Qty: 30 TABLET | Refills: 0 | Status: SHIPPED | OUTPATIENT
Start: 2024-03-07

## 2024-03-07 RX ORDER — ESOMEPRAZOLE MAGNESIUM 40 MG/1
40 CAPSULE, DELAYED RELEASE ORAL DAILY
Qty: 30 CAPSULE | Refills: 0 | Status: SHIPPED | OUTPATIENT
Start: 2024-03-07

## 2024-03-31 DIAGNOSIS — F43.22 ADJUSTMENT DISORDER WITH ANXIOUS MOOD: ICD-10-CM

## 2024-03-31 DIAGNOSIS — K21.9 GASTROESOPHAGEAL REFLUX DISEASE WITHOUT ESOPHAGITIS: ICD-10-CM

## 2024-04-01 RX ORDER — ESOMEPRAZOLE MAGNESIUM 40 MG/1
40 CAPSULE, DELAYED RELEASE ORAL DAILY
Qty: 30 CAPSULE | Refills: 0 | Status: SHIPPED | OUTPATIENT
Start: 2024-04-01

## 2024-04-01 RX ORDER — SERTRALINE HYDROCHLORIDE 25 MG/1
25 TABLET, FILM COATED ORAL DAILY
Qty: 30 TABLET | Refills: 0 | Status: SHIPPED | OUTPATIENT
Start: 2024-04-01

## 2024-04-24 DIAGNOSIS — K21.9 GASTROESOPHAGEAL REFLUX DISEASE WITHOUT ESOPHAGITIS: ICD-10-CM

## 2024-04-24 DIAGNOSIS — F43.22 ADJUSTMENT DISORDER WITH ANXIOUS MOOD: ICD-10-CM

## 2024-04-25 RX ORDER — ESOMEPRAZOLE MAGNESIUM 40 MG/1
40 CAPSULE, DELAYED RELEASE ORAL DAILY
Qty: 30 CAPSULE | Refills: 0 | Status: SHIPPED | OUTPATIENT
Start: 2024-04-25

## 2024-04-25 RX ORDER — SERTRALINE HYDROCHLORIDE 25 MG/1
25 TABLET, FILM COATED ORAL DAILY
Qty: 30 TABLET | Refills: 0 | Status: SHIPPED | OUTPATIENT
Start: 2024-04-25

## 2024-05-19 DIAGNOSIS — F43.22 ADJUSTMENT DISORDER WITH ANXIOUS MOOD: ICD-10-CM

## 2024-05-19 DIAGNOSIS — K21.9 GASTROESOPHAGEAL REFLUX DISEASE WITHOUT ESOPHAGITIS: ICD-10-CM

## 2024-05-20 RX ORDER — ESOMEPRAZOLE MAGNESIUM 40 MG/1
40 CAPSULE, DELAYED RELEASE ORAL DAILY
Qty: 30 CAPSULE | Refills: 0 | Status: SHIPPED | OUTPATIENT
Start: 2024-05-20

## 2024-05-20 RX ORDER — SERTRALINE HYDROCHLORIDE 25 MG/1
25 TABLET, FILM COATED ORAL DAILY
Qty: 30 TABLET | Refills: 0 | Status: SHIPPED | OUTPATIENT
Start: 2024-05-20

## 2024-05-30 DIAGNOSIS — F43.22 ADJUSTMENT DISORDER WITH ANXIOUS MOOD: ICD-10-CM

## 2024-05-30 DIAGNOSIS — K21.9 GASTROESOPHAGEAL REFLUX DISEASE WITHOUT ESOPHAGITIS: ICD-10-CM

## 2024-05-30 RX ORDER — ESOMEPRAZOLE MAGNESIUM 40 MG/1
40 CAPSULE, DELAYED RELEASE ORAL DAILY
Qty: 30 CAPSULE | Refills: 0 | OUTPATIENT
Start: 2024-05-30

## 2024-05-30 RX ORDER — HYDROXYZINE HYDROCHLORIDE 25 MG/1
TABLET, FILM COATED ORAL
Qty: 90 TABLET | Refills: 0 | OUTPATIENT
Start: 2024-05-30

## 2024-05-30 RX ORDER — SERTRALINE HYDROCHLORIDE 25 MG/1
25 TABLET, FILM COATED ORAL DAILY
Qty: 30 TABLET | Refills: 0 | OUTPATIENT
Start: 2024-05-30

## 2024-06-05 DIAGNOSIS — F43.22 ADJUSTMENT DISORDER WITH ANXIOUS MOOD: ICD-10-CM

## 2024-06-06 RX ORDER — HYDROXYZINE HYDROCHLORIDE 25 MG/1
25 TABLET, FILM COATED ORAL 3 TIMES DAILY PRN
Qty: 90 TABLET | Refills: 0 | Status: SHIPPED | OUTPATIENT
Start: 2024-06-06

## 2024-06-25 ENCOUNTER — OFFICE VISIT (OUTPATIENT)
Facility: CLINIC | Age: 49
End: 2024-06-25
Payer: COMMERCIAL

## 2024-06-25 VITALS
OXYGEN SATURATION: 98 % | RESPIRATION RATE: 16 BRPM | BODY MASS INDEX: 47.34 KG/M2 | WEIGHT: 267.2 LBS | TEMPERATURE: 97.5 F | HEIGHT: 63 IN | SYSTOLIC BLOOD PRESSURE: 125 MMHG | DIASTOLIC BLOOD PRESSURE: 81 MMHG | HEART RATE: 97 BPM

## 2024-06-25 DIAGNOSIS — E78.2 MIXED HYPERLIPIDEMIA: ICD-10-CM

## 2024-06-25 DIAGNOSIS — M65.311 TRIGGER FINGER OF RIGHT THUMB: Primary | ICD-10-CM

## 2024-06-25 DIAGNOSIS — K21.9 GASTROESOPHAGEAL REFLUX DISEASE WITHOUT ESOPHAGITIS: ICD-10-CM

## 2024-06-25 DIAGNOSIS — R73.9 ELEVATED BLOOD SUGAR: ICD-10-CM

## 2024-06-25 DIAGNOSIS — F43.22 ADJUSTMENT DISORDER WITH ANXIOUS MOOD: ICD-10-CM

## 2024-06-25 PROCEDURE — 99214 OFFICE O/P EST MOD 30 MIN: CPT | Performed by: FAMILY MEDICINE

## 2024-06-25 RX ORDER — SERTRALINE HYDROCHLORIDE 25 MG/1
25 TABLET, FILM COATED ORAL DAILY
Qty: 90 TABLET | Refills: 1 | Status: SHIPPED | OUTPATIENT
Start: 2024-06-25

## 2024-06-25 RX ORDER — ESOMEPRAZOLE MAGNESIUM 40 MG/1
40 CAPSULE, DELAYED RELEASE ORAL DAILY
Qty: 90 CAPSULE | Refills: 1 | Status: SHIPPED | OUTPATIENT
Start: 2024-06-25 | End: 2024-06-25

## 2024-06-25 RX ORDER — SERTRALINE HYDROCHLORIDE 25 MG/1
25 TABLET, FILM COATED ORAL DAILY
Qty: 90 TABLET | Refills: 1 | Status: SHIPPED | OUTPATIENT
Start: 2024-06-25 | End: 2024-06-25

## 2024-06-25 RX ORDER — ESOMEPRAZOLE MAGNESIUM 40 MG/1
40 CAPSULE, DELAYED RELEASE ORAL DAILY
Qty: 90 CAPSULE | Refills: 1 | Status: SHIPPED | OUTPATIENT
Start: 2024-06-25

## 2024-06-25 RX ORDER — HYDROXYZINE HYDROCHLORIDE 25 MG/1
25 TABLET, FILM COATED ORAL 3 TIMES DAILY PRN
Qty: 270 TABLET | Refills: 1 | Status: SHIPPED | OUTPATIENT
Start: 2024-06-25

## 2024-06-25 RX ORDER — PREDNISONE 10 MG/1
10 TABLET ORAL DAILY
Qty: 10 TABLET | Refills: 0 | Status: SHIPPED | OUTPATIENT
Start: 2024-06-25 | End: 2024-07-05

## 2024-06-25 ASSESSMENT — PATIENT HEALTH QUESTIONNAIRE - PHQ9
SUM OF ALL RESPONSES TO PHQ9 QUESTIONS 1 & 2: 0
SUM OF ALL RESPONSES TO PHQ QUESTIONS 1-9: 0
SUM OF ALL RESPONSES TO PHQ QUESTIONS 1-9: 0
1. LITTLE INTEREST OR PLEASURE IN DOING THINGS: NOT AT ALL
2. FEELING DOWN, DEPRESSED OR HOPELESS: NOT AT ALL
SUM OF ALL RESPONSES TO PHQ QUESTIONS 1-9: 0
SUM OF ALL RESPONSES TO PHQ QUESTIONS 1-9: 0

## 2024-06-25 ASSESSMENT — ENCOUNTER SYMPTOMS
CHEST TIGHTNESS: 0
APNEA: 0
ABDOMINAL PAIN: 0

## 2024-06-25 NOTE — PROGRESS NOTES
Chief Complaint   Patient presents with    Follow-up Chronic Condition    Medication Refill      \"Have you been to the ER, urgent care clinic since your last visit?  Hospitalized since your last visit?\"    NO    “Have you seen or consulted any other health care providers outside of [facility] since your last visit?”    NO        “Have you had a colorectal cancer screening such as a colonoscopy/FIT/Cologuard?    NO    No colonoscopy on file  No cologuard on file  No FIT/FOBT on file   No flexible sigmoidoscopy on file         Click Here for Release of Records Request     Health Maintenance Due   Topic Date Due    Pneumococcal 0-64 years Vaccine (1 of 2 - PCV) Never done    Hepatitis B vaccine (2 of 3 - 19+ 3-dose series) 09/13/2007    DTaP/Tdap/Td vaccine (2 - Tdap) 08/16/2017    Colorectal Cancer Screen  Never done    Shingles vaccine (1 of 2) Never done    COVID-19 Vaccine (5 - 2023-24 season) 09/01/2023    Cervical cancer screen  06/29/2024    A1C test (Diabetic or Prediabetic)  07/19/2024    Depression Screen  07/19/2024

## 2024-06-25 NOTE — PROGRESS NOTES
[facility]    History of Present Illness:   Codi Davis is a 49 y.o. female with history of GERD, Adjustment Disorder, Obesity   CC: Follow up  History provided by patient and Records    HPI:  Carpal Tunnel Follow up: Had surgery a year ago and carpal tunnel has improved significantly, no numbness.    Trigger Finger: Noting trigger finger of the right thumb for the last month, is painful and limiting and constant.     Gastroesophageal Reflux:  Current control of Symptoms: Stable  Primary symptoms: Nexium  Hiatal Hernia: No  Current Medications: Nexium  The patient has no history melena or bright red blood in the stools.  The patient avoids high dose aspirin and NSAID therapy.  The patient is aware of diet changes needed, elevating the head of the bed and appropriate use of antacids.      Anxiety/Adjustment Disorder: Controlled with Sertraline and Hydroxyzine.     Health Maintenance  Health Maintenance Due   Topic Date Due    Pneumococcal 0-64 years Vaccine (1 of 2 - PCV) Never done    Hepatitis B vaccine (2 of 3 - 19+ 3-dose series) 09/13/2007    DTaP/Tdap/Td vaccine (2 - Tdap) 08/16/2017    Colorectal Cancer Screen  Never done    Shingles vaccine (1 of 2) Never done    COVID-19 Vaccine (5 - 2023-24 season) 09/01/2023    Cervical cancer screen  06/29/2024    A1C test (Diabetic or Prediabetic)  07/19/2024    Depression Screen  07/19/2024       Past Medical, Family, and Social History:     Current Outpatient Medications on File Prior to Visit   Medication Sig Dispense Refill    Multiple Vitamins-Minerals (THERAPEUTIC MULTIVITAMIN-MINERALS) tablet Take 1 tablet by mouth daily 30 tablet 11     No current facility-administered medications on file prior to visit.       Patient Active Problem List   Diagnosis    Adjustment disorder with anxious mood    Abnormal EKG    GERD (gastroesophageal reflux disease)    Severe obesity with body mass index (BMI) of 35.0 to 39.9 with serious comorbidity (HCC)

## 2024-07-23 ENCOUNTER — PATIENT MESSAGE (OUTPATIENT)
Facility: CLINIC | Age: 49
End: 2024-07-23

## 2024-07-23 DIAGNOSIS — K21.9 GASTROESOPHAGEAL REFLUX DISEASE WITHOUT ESOPHAGITIS: ICD-10-CM

## 2024-07-24 RX ORDER — ESOMEPRAZOLE MAGNESIUM 40 MG/1
40 CAPSULE, DELAYED RELEASE ORAL DAILY
Qty: 90 CAPSULE | Refills: 1 | Status: SHIPPED | OUTPATIENT
Start: 2024-07-24

## 2024-07-24 NOTE — TELEPHONE ENCOUNTER
From: Codi Davis  To: Dr. Luis New  Sent: 7/23/2024 4:19 PM EDT  Subject: Med question    Hi. My Rx for my nexium was sent to [facility]. I usually get it through the mail order. It is a different brand and is NOT working at all! Is it possible for me to get it sent through my mail order pharmacy being that I just got it from [facility]. I am miserable. Thanks.   Also, lab was closed the day of my appt so I didn’t get them done. I will get back for them asap.   Thanks

## 2024-09-06 ENCOUNTER — COMMUNITY OUTREACH (OUTPATIENT)
Facility: CLINIC | Age: 49
End: 2024-09-06

## 2025-01-06 DIAGNOSIS — F43.22 ADJUSTMENT DISORDER WITH ANXIOUS MOOD: ICD-10-CM

## 2025-01-06 RX ORDER — SERTRALINE HYDROCHLORIDE 25 MG/1
25 TABLET, FILM COATED ORAL DAILY
Qty: 90 TABLET | Refills: 1 | Status: SHIPPED | OUTPATIENT
Start: 2025-01-06

## 2025-01-07 DIAGNOSIS — F43.22 ADJUSTMENT DISORDER WITH ANXIOUS MOOD: ICD-10-CM

## 2025-01-07 RX ORDER — SERTRALINE HYDROCHLORIDE 25 MG/1
25 TABLET, FILM COATED ORAL DAILY
Qty: 90 TABLET | Refills: 1 | OUTPATIENT
Start: 2025-01-07

## 2025-01-13 ENCOUNTER — OFFICE VISIT (OUTPATIENT)
Facility: CLINIC | Age: 50
End: 2025-01-13
Payer: COMMERCIAL

## 2025-01-13 VITALS
SYSTOLIC BLOOD PRESSURE: 125 MMHG | HEART RATE: 88 BPM | WEIGHT: 266 LBS | OXYGEN SATURATION: 100 % | TEMPERATURE: 98.1 F | HEIGHT: 63 IN | BODY MASS INDEX: 47.13 KG/M2 | RESPIRATION RATE: 18 BRPM | DIASTOLIC BLOOD PRESSURE: 81 MMHG

## 2025-01-13 DIAGNOSIS — R73.9 ELEVATED BLOOD SUGAR: ICD-10-CM

## 2025-01-13 DIAGNOSIS — F43.22 ADJUSTMENT DISORDER WITH ANXIOUS MOOD: ICD-10-CM

## 2025-01-13 DIAGNOSIS — E66.01 SEVERE OBESITY WITH BODY MASS INDEX (BMI) OF 35.0 TO 39.9 WITH SERIOUS COMORBIDITY: ICD-10-CM

## 2025-01-13 DIAGNOSIS — E78.2 MIXED HYPERLIPIDEMIA: ICD-10-CM

## 2025-01-13 DIAGNOSIS — Z12.11 COLON CANCER SCREENING: ICD-10-CM

## 2025-01-13 DIAGNOSIS — K21.9 GASTROESOPHAGEAL REFLUX DISEASE WITHOUT ESOPHAGITIS: Primary | ICD-10-CM

## 2025-01-13 PROCEDURE — 99214 OFFICE O/P EST MOD 30 MIN: CPT | Performed by: FAMILY MEDICINE

## 2025-01-13 RX ORDER — BUPROPION HYDROCHLORIDE 150 MG/1
150 TABLET ORAL EVERY MORNING
Qty: 30 TABLET | Refills: 1 | Status: SHIPPED | OUTPATIENT
Start: 2025-01-13

## 2025-01-13 SDOH — ECONOMIC STABILITY: INCOME INSECURITY: IN THE LAST 12 MONTHS, WAS THERE A TIME WHEN YOU WERE NOT ABLE TO PAY THE MORTGAGE OR RENT ON TIME?: NO

## 2025-01-13 SDOH — ECONOMIC STABILITY: TRANSPORTATION INSECURITY
IN THE PAST 12 MONTHS, HAS LACK OF TRANSPORTATION KEPT YOU FROM MEETINGS, WORK, OR FROM GETTING THINGS NEEDED FOR DAILY LIVING?: NO

## 2025-01-13 SDOH — ECONOMIC STABILITY: FOOD INSECURITY: WITHIN THE PAST 12 MONTHS, YOU WORRIED THAT YOUR FOOD WOULD RUN OUT BEFORE YOU GOT MONEY TO BUY MORE.: NEVER TRUE

## 2025-01-13 SDOH — ECONOMIC STABILITY: FOOD INSECURITY: WITHIN THE PAST 12 MONTHS, THE FOOD YOU BOUGHT JUST DIDN'T LAST AND YOU DIDN'T HAVE MONEY TO GET MORE.: NEVER TRUE

## 2025-01-13 SDOH — ECONOMIC STABILITY: TRANSPORTATION INSECURITY
IN THE PAST 12 MONTHS, HAS THE LACK OF TRANSPORTATION KEPT YOU FROM MEDICAL APPOINTMENTS OR FROM GETTING MEDICATIONS?: NO

## 2025-01-13 ASSESSMENT — PATIENT HEALTH QUESTIONNAIRE - PHQ9
SUM OF ALL RESPONSES TO PHQ QUESTIONS 1-9: 0
SUM OF ALL RESPONSES TO PHQ QUESTIONS 1-9: 0
SUM OF ALL RESPONSES TO PHQ9 QUESTIONS 1 & 2: 0
1. LITTLE INTEREST OR PLEASURE IN DOING THINGS: NOT AT ALL
SUM OF ALL RESPONSES TO PHQ QUESTIONS 1-9: 0
SUM OF ALL RESPONSES TO PHQ QUESTIONS 1-9: 0
2. FEELING DOWN, DEPRESSED OR HOPELESS: NOT AT ALL

## 2025-01-13 ASSESSMENT — ENCOUNTER SYMPTOMS
ABDOMINAL DISTENTION: 0
ABDOMINAL PAIN: 0

## 2025-01-13 NOTE — PROGRESS NOTES
[facility]    History of Present Illness:   Codi Davis is a 49 y.o. female with history of GERD, Adjustment Disorder, Obesity   CC: Follow up   History provided by patient and Records    HPI:  Anxiety/Adjustment Disorder:Patient reports father  of cancer last week and reports has been having issues with grieving though has been back on Zoloft and that has been helping as well.  Patient is also using Hydroxyzine.    Gastroesophageal Reflux:  Current control of Symptoms: Stable  Primary symptoms: Nexium  Hiatal Hernia: No  Current Medications: Nexium  The patient has no history melena or bright red blood in the stools.  The patient avoids high dose aspirin and NSAID therapy.  The patient is aware of diet changes needed, elevating the head of the bed and appropriate use of antacids.      Obesity:   Duration of problem:    Peceived Ideal weight: Under 200 lbs   Highest weight: 267 lbs   Lowest Adult weight: 180 Lbs   Failed diet plans: Weight Watchers (Several times), Think thin, [employer] (Did lose 10 lbs)   Medications: Phentermine (Wore off after a year), Topamax caused neuropathy.   Current diet plan: None   Exercise: None     Wt Readings from Last 3 Encounters:   25 120.7 kg (266 lb)   24 121.2 kg (267 lb 3.2 oz)   23 118.8 kg (262 lb)                 has not lost weight   Body mass index is 47.12 kg/m².         Health Maintenance  Health Maintenance Due   Topic Date Due    DTaP/Tdap/Td vaccine (2 - Tdap) 2017    Colorectal Cancer Screen  Never done    Cervical cancer screen  2024    A1C test (Diabetic or Prediabetic)  2024    COVID-19 Vaccine ( season) 2024       Past Medical, Family, and Social History:     Current Outpatient Medications on File Prior to Visit   Medication Sig Dispense Refill    sertraline (ZOLOFT) 25 MG tablet TAKE 1 TABLET BY MOUTH DAILY 90 tablet 1    esomeprazole (NEXIUM) 40 MG delayed release capsule Take 1

## 2025-01-13 NOTE — PROGRESS NOTES
Chief Complaint   Patient presents with    6 Month Follow-Up         \"Have you been to the ER, urgent care clinic since your last visit?  Hospitalized since your last visit?\"    NO    “Have you seen or consulted any other health care providers outside of [facility] since your last visit?”    NO     “Have you had a pap smear?”    NO        “Have you had a colorectal cancer screening such as a colonoscopy/FIT/Cologuard?    NO           Click Here for Release of Records Request     Health Maintenance Due   Topic Date Due    Pneumococcal 0-64 years Vaccine (1 of 2 - PCV) Never done    HIV screen  Never done    Hepatitis B vaccine (2 of 3 - 19+ 3-dose series) 09/13/2007    DTaP/Tdap/Td vaccine (2 - Tdap) 08/16/2017    Colorectal Cancer Screen  Never done    Cervical cancer screen  06/29/2024    A1C test (Diabetic or Prediabetic)  07/19/2024    COVID-19 Vaccine (5 - 2023-24 season) 09/01/2024

## 2025-01-15 LAB
ALBUMIN SERPL-MCNC: 3.8 G/DL (ref 3.5–5)
ALBUMIN/GLOB SERPL: 1.2 (ref 1.1–2.2)
ALP SERPL-CCNC: 72 U/L (ref 45–117)
ALT SERPL-CCNC: 52 U/L (ref 12–78)
ANION GAP SERPL CALC-SCNC: 8 MMOL/L (ref 2–12)
AST SERPL-CCNC: 38 U/L (ref 15–37)
BILIRUB SERPL-MCNC: 0.4 MG/DL (ref 0.2–1)
BUN SERPL-MCNC: 13 MG/DL (ref 6–20)
BUN/CREAT SERPL: 22 (ref 12–20)
CALCIUM SERPL-MCNC: 9.2 MG/DL (ref 8.5–10.1)
CHLORIDE SERPL-SCNC: 106 MMOL/L (ref 97–108)
CHOLEST SERPL-MCNC: 178 MG/DL
CO2 SERPL-SCNC: 26 MMOL/L (ref 21–32)
CREAT SERPL-MCNC: 0.59 MG/DL (ref 0.55–1.02)
ERYTHROCYTE [DISTWIDTH] IN BLOOD BY AUTOMATED COUNT: 13.6 % (ref 11.5–14.5)
EST. AVERAGE GLUCOSE BLD GHB EST-MCNC: 131 MG/DL
GLOBULIN SER CALC-MCNC: 3.2 G/DL (ref 2–4)
GLUCOSE SERPL-MCNC: 126 MG/DL (ref 65–100)
HBA1C MFR BLD: 6.2 % (ref 4–5.6)
HCT VFR BLD AUTO: 44.5 % (ref 35–47)
HDLC SERPL-MCNC: 48 MG/DL
HDLC SERPL: 3.7 (ref 0–5)
HGB BLD-MCNC: 14.2 G/DL (ref 11.5–16)
LDLC SERPL CALC-MCNC: 97 MG/DL (ref 0–100)
MCH RBC QN AUTO: 31 PG (ref 26–34)
MCHC RBC AUTO-ENTMCNC: 31.9 G/DL (ref 30–36.5)
MCV RBC AUTO: 97.2 FL (ref 80–99)
NRBC # BLD: 0 K/UL (ref 0–0.01)
NRBC BLD-RTO: 0 PER 100 WBC
PLATELET # BLD AUTO: 299 K/UL (ref 150–400)
PMV BLD AUTO: 10.5 FL (ref 8.9–12.9)
POTASSIUM SERPL-SCNC: 4.4 MMOL/L (ref 3.5–5.1)
PROT SERPL-MCNC: 7 G/DL (ref 6.4–8.2)
RBC # BLD AUTO: 4.58 M/UL (ref 3.8–5.2)
SODIUM SERPL-SCNC: 140 MMOL/L (ref 136–145)
TRIGL SERPL-MCNC: 165 MG/DL
VLDLC SERPL CALC-MCNC: 33 MG/DL
WBC # BLD AUTO: 6.8 K/UL (ref 3.6–11)

## 2025-02-12 DIAGNOSIS — F43.22 ADJUSTMENT DISORDER WITH ANXIOUS MOOD: ICD-10-CM

## 2025-02-12 RX ORDER — HYDROXYZINE HYDROCHLORIDE 25 MG/1
25 TABLET, FILM COATED ORAL 3 TIMES DAILY PRN
Qty: 270 TABLET | Refills: 1 | Status: SHIPPED | OUTPATIENT
Start: 2025-02-12

## 2025-03-26 ENCOUNTER — TELEPHONE (OUTPATIENT)
Facility: CLINIC | Age: 50
End: 2025-03-26

## 2025-03-26 DIAGNOSIS — E66.01 SEVERE OBESITY WITH BODY MASS INDEX (BMI) OF 35.0 TO 39.9 WITH SERIOUS COMORBIDITY: ICD-10-CM

## 2025-03-26 DIAGNOSIS — F43.22 ADJUSTMENT DISORDER WITH ANXIOUS MOOD: ICD-10-CM

## 2025-03-26 RX ORDER — BUPROPION HYDROCHLORIDE 150 MG/1
150 TABLET ORAL EVERY MORNING
Qty: 90 TABLET | Refills: 1 | Status: SHIPPED | OUTPATIENT
Start: 2025-03-26

## 2025-03-26 NOTE — TELEPHONE ENCOUNTER
Pharmacy states pt need refill on med. Please send to [facility] on [address]. Please advise. buPROPion (WELLBUTRIN XL) 150 MG extended release tablet

## 2025-04-09 DIAGNOSIS — K21.9 GASTROESOPHAGEAL REFLUX DISEASE WITHOUT ESOPHAGITIS: ICD-10-CM

## 2025-04-09 RX ORDER — ESOMEPRAZOLE MAGNESIUM 40 MG/1
40 CAPSULE, DELAYED RELEASE ORAL DAILY
Qty: 90 CAPSULE | Refills: 1 | Status: SHIPPED | OUTPATIENT
Start: 2025-04-09

## 2025-05-07 ENCOUNTER — PATIENT MESSAGE (OUTPATIENT)
Facility: CLINIC | Age: 50
End: 2025-05-07

## 2025-05-07 DIAGNOSIS — L60.0 INGROWN NAIL OF GREAT TOE: Primary | ICD-10-CM

## 2025-06-28 DIAGNOSIS — F43.22 ADJUSTMENT DISORDER WITH ANXIOUS MOOD: ICD-10-CM

## 2025-06-30 RX ORDER — SERTRALINE HYDROCHLORIDE 25 MG/1
25 TABLET, FILM COATED ORAL DAILY
Qty: 90 TABLET | Refills: 1 | Status: SHIPPED | OUTPATIENT
Start: 2025-06-30

## (undated) DEVICE — Device

## (undated) DEVICE — BLADELESS OPTICAL TROCAR WITH FIXATION CANNULA: Brand: VERSAPORT

## (undated) DEVICE — INFECTION CONTROL KIT SYS

## (undated) DEVICE — UNIVERSAL FIXATION CANNULA: Brand: VERSAONE

## (undated) DEVICE — DERMABOND SKIN ADH 0.7ML -- DERMABOND ADVANCED 12/BX

## (undated) DEVICE — TUBING INSUFLTN 10FT LUER -- CONVERT TO ITEM 368568

## (undated) DEVICE — DEVICE TRNSF SPIK STL 2008S] MICROTEK MEDICAL INC]

## (undated) DEVICE — 3000CC GUARDIAN II: Brand: GUARDIAN

## (undated) DEVICE — DEVON™ KNEE AND BODY STRAP 60" X 3" (1.5 M X 7.6 CM): Brand: DEVON

## (undated) DEVICE — APPLIER LIG CLP 5MM CONTAIN 16 TI CLP DISP ENDO CLP

## (undated) DEVICE — E-Z CLEAN, PTFE COATED, ELECTROSURGICAL LAPAROSCOPIC ELECTRODE, L-HOOK, 33 CM., SINGLE-USE, FOR USE WITH HAND CONTROL PENCIL: Brand: MEGADYNE

## (undated) DEVICE — SPECIMEN RETRIEVAL POUCH: Brand: ENDO CATCH GOLD

## (undated) DEVICE — NEEDLE HYPO 22GA L1.5IN BLK S STL HUB POLYPR SHLD REG BVL

## (undated) DEVICE — STERILE POLYISOPRENE POWDER-FREE SURGICAL GLOVES: Brand: PROTEXIS

## (undated) DEVICE — SET CHOLANGIOGRAPHY 4FR L60CM W/ ARW KARLAN BLLN CATH CRV

## (undated) DEVICE — SYRINGE MED 20ML STD CLR PLAS LUERLOCK TIP N CTRL DISP

## (undated) DEVICE — REM POLYHESIVE ADULT PATIENT RETURN ELECTRODE: Brand: VALLEYLAB

## (undated) DEVICE — BLADELESS OPTICAL TROCAR WITH FIXATION CANNULA: Brand: VERSAONE

## (undated) DEVICE — DRAPE XR C ARM 41X74IN LF --

## (undated) DEVICE — KENDALL SCD EXPRESS SLEEVES, KNEE LENGTH, MEDIUM: Brand: KENDALL SCD

## (undated) DEVICE — SURGICAL PROCEDURE KIT GEN LAPAROSCOPY LF

## (undated) DEVICE — DRAPE,REIN 53X77,STERILE: Brand: MEDLINE

## (undated) DEVICE — (D)PREP SKN CHLRAPRP APPL 26ML -- CONVERT TO ITEM 371833

## (undated) DEVICE — TROCAR SITE CLOSURE DEVICE: Brand: ENDO CLOSE

## (undated) DEVICE — SUTURE MCRYL SZ 4-0 L27IN ABSRB UD L19MM PS-2 1/2 CIR PRIM Y426H

## (undated) DEVICE — SOL IRRIGATION INJ NACL 0.9% 500ML BTL

## (undated) DEVICE — SUTURE SZ 0 27IN 5/8 CIR UR-6  TAPER PT VIOLET ABSRB VICRYL J603H

## (undated) DEVICE — COVER LT HNDL BLU PLAS

## (undated) DEVICE — CLICKLINE SCISSORS INSERT: Brand: CLICKLINE